# Patient Record
Sex: FEMALE | Race: WHITE | NOT HISPANIC OR LATINO | Employment: OTHER | URBAN - METROPOLITAN AREA
[De-identification: names, ages, dates, MRNs, and addresses within clinical notes are randomized per-mention and may not be internally consistent; named-entity substitution may affect disease eponyms.]

---

## 2017-08-09 ENCOUNTER — HISTORICAL (OUTPATIENT)
Dept: RADIOLOGY | Facility: HOSPITAL | Age: 62
End: 2017-08-09

## 2017-08-09 LAB
ABS NEUT (OLG): 3.76 X10(3)/MCL (ref 2.1–9.2)
ALBUMIN SERPL-MCNC: 3.9 GM/DL (ref 3.4–5)
ALBUMIN/GLOB SERPL: 1 {RATIO}
ALP SERPL-CCNC: 60 UNIT/L (ref 45–117)
ALT SERPL-CCNC: 24 UNIT/L (ref 13–56)
AST SERPL-CCNC: 19 UNIT/L (ref 15–37)
BASOPHILS # BLD AUTO: 0.08 X10(3)/MCL (ref 0–0.2)
BASOPHILS NFR BLD AUTO: 1.2 % (ref 0–1)
BILIRUB SERPL-MCNC: 0.5 MG/DL (ref 0.2–1)
BILIRUBIN DIRECT+TOT PNL SERPL-MCNC: 0.1 MG/DL (ref 0–0.2)
BILIRUBIN DIRECT+TOT PNL SERPL-MCNC: 0.4 MG/DL (ref 0–1)
BUN SERPL-MCNC: 18 MG/DL (ref 7–18)
CALCIUM SERPL-MCNC: 9.5 MG/DL (ref 8.5–10.1)
CANCER AG125 SERPL-ACNC: 14.3 IU/ML (ref 1.5–35)
CHLORIDE SERPL-SCNC: 103 MMOL/L (ref 98–107)
CHOLEST SERPL-MCNC: 215 MG/DL (ref 0–200)
CHOLEST/HDLC SERPL: 2.4 {RATIO} (ref 0–4)
CO2 SERPL-SCNC: 32 MMOL/L (ref 21–32)
CREAT SERPL-MCNC: 0.87 MG/DL (ref 0.55–1.02)
DEPRECATED CALCIDIOL+CALCIFEROL SERPL-MC: 41.04 NG/ML (ref 30–80)
EOSINOPHIL # BLD AUTO: 0.3 X10(3)/MCL (ref 0–0.9)
EOSINOPHIL NFR BLD AUTO: 4.5 % (ref 0–6.4)
ERYTHROCYTE [DISTWIDTH] IN BLOOD BY AUTOMATED COUNT: 14.2 % (ref 11.5–17)
EST. AVERAGE GLUCOSE BLD GHB EST-MCNC: 108 MG/DL
GLOBULIN SER-MCNC: 4 GM/DL (ref 2–4)
GLUCOSE SERPL-MCNC: 95 MG/DL (ref 74–106)
HBA1C MFR BLD: 5.4 % (ref 4.2–6.3)
HCT VFR BLD AUTO: 41.8 % (ref 37–47)
HDLC SERPL-MCNC: 90 MG/DL (ref 35–60)
HGB BLD-MCNC: 13.9 GM/DL (ref 12–16)
IMM GRANULOCYTES # BLD AUTO: 0.01 10*3/UL (ref 0–0.02)
IMM GRANULOCYTES NFR BLD AUTO: 0.2 % (ref 0–0.43)
LDLC SERPL CALC-MCNC: 114 MG/DL (ref 0–129)
LYMPHOCYTES # BLD AUTO: 2.08 X10(3)/MCL (ref 0.6–4.6)
LYMPHOCYTES NFR BLD AUTO: 31.4 % (ref 16–44)
MCH RBC QN AUTO: 29.3 PG (ref 27–31)
MCHC RBC AUTO-ENTMCNC: 33.3 GM/DL (ref 33–36)
MCV RBC AUTO: 88 FL (ref 80–94)
MONOCYTES # BLD AUTO: 0.39 X10(3)/MCL (ref 0.1–1.3)
MONOCYTES NFR BLD AUTO: 5.9 % (ref 4–12.1)
NEUTROPHILS # BLD AUTO: 3.76 X10(3)/MCL (ref 2.1–9.2)
NEUTROPHILS NFR BLD AUTO: 56.8 % (ref 43–73)
NRBC BLD AUTO-RTO: 0 % (ref 0–0.2)
PLATELET # BLD AUTO: 306 X10(3)/MCL (ref 130–400)
PMV BLD AUTO: 8.9 FL (ref 7.4–10.4)
POTASSIUM SERPL-SCNC: 4.4 MMOL/L (ref 3.5–5.1)
PROT SERPL-MCNC: 7.8 GM/DL (ref 6.4–8.2)
RBC # BLD AUTO: 4.75 X10(6)/MCL (ref 4.2–5.4)
SODIUM SERPL-SCNC: 140 MMOL/L (ref 136–145)
T4 FREE SERPL-MCNC: 1.14 NG/DL (ref 0.76–1.46)
TRIGL SERPL-MCNC: 57 MG/DL (ref 30–150)
TSH SERPL-ACNC: 1.08 MIU/ML (ref 0.36–3.74)
VLDLC SERPL CALC-MCNC: 11 MG/DL
WBC # SPEC AUTO: 6.6 X10(3)/MCL (ref 4.5–11.5)

## 2018-01-09 ENCOUNTER — HISTORICAL (OUTPATIENT)
Dept: RADIOLOGY | Facility: HOSPITAL | Age: 63
End: 2018-01-09

## 2019-11-12 ENCOUNTER — HISTORICAL (OUTPATIENT)
Dept: RADIATION THERAPY | Facility: HOSPITAL | Age: 64
End: 2019-11-12

## 2020-04-27 ENCOUNTER — HISTORICAL (OUTPATIENT)
Dept: HEMATOLOGY/ONCOLOGY | Facility: CLINIC | Age: 65
End: 2020-04-27

## 2020-04-27 LAB
ABS NEUT (OLG): 4.72 X10(3)/MCL (ref 2.1–9.2)
ALBUMIN SERPL-MCNC: 3.8 GM/DL (ref 3.4–4.8)
ALBUMIN/GLOB SERPL: 1.1 RATIO (ref 1.1–2)
ALP SERPL-CCNC: 49 UNIT/L (ref 40–150)
ALT SERPL-CCNC: 17 UNIT/L (ref 0–55)
AST SERPL-CCNC: 22 UNIT/L (ref 5–34)
BASOPHILS # BLD AUTO: 0.1 X10(3)/MCL (ref 0–0.2)
BASOPHILS NFR BLD AUTO: 0.8 %
BILIRUB SERPL-MCNC: 0.3 MG/DL
BILIRUBIN DIRECT+TOT PNL SERPL-MCNC: 0.1 MG/DL (ref 0–0.8)
BILIRUBIN DIRECT+TOT PNL SERPL-MCNC: 0.2 MG/DL (ref 0–0.5)
BUN SERPL-MCNC: 26 MG/DL (ref 9.8–20.1)
CALCIUM SERPL-MCNC: 9.4 MG/DL (ref 8.4–10.2)
CEA SERPL-MCNC: <1.73 NG/ML (ref 0–3)
CHLORIDE SERPL-SCNC: 102 MMOL/L (ref 98–107)
CO2 SERPL-SCNC: 33 MMOL/L (ref 23–31)
CREAT SERPL-MCNC: 1.17 MG/DL (ref 0.55–1.02)
EOSINOPHIL # BLD AUTO: 0.3 X10(3)/MCL (ref 0–0.9)
EOSINOPHIL NFR BLD AUTO: 4.2 %
ERYTHROCYTE [DISTWIDTH] IN BLOOD BY AUTOMATED COUNT: 14.6 % (ref 11.5–17)
GLOBULIN SER-MCNC: 3.4 GM/DL (ref 2.4–3.5)
GLUCOSE SERPL-MCNC: 113 MG/DL (ref 82–115)
HCT VFR BLD AUTO: 40.7 % (ref 37–47)
HGB BLD-MCNC: 12.8 GM/DL (ref 12–16)
LYMPHOCYTES # BLD AUTO: 2 X10(3)/MCL (ref 0.6–4.6)
LYMPHOCYTES NFR BLD AUTO: 25.7 %
MCH RBC QN AUTO: 29 PG (ref 27–31)
MCHC RBC AUTO-ENTMCNC: 31.4 GM/DL (ref 33–36)
MCV RBC AUTO: 92.3 FL (ref 80–94)
MONOCYTES # BLD AUTO: 0.6 X10(3)/MCL (ref 0.1–1.3)
MONOCYTES NFR BLD AUTO: 7.9 %
NEUTROPHILS # BLD AUTO: 4.7 X10(3)/MCL (ref 2.1–9.2)
NEUTROPHILS NFR BLD AUTO: 61.1 %
PLATELET # BLD AUTO: 313 X10(3)/MCL (ref 130–400)
PMV BLD AUTO: 9.1 FL (ref 9.4–12.4)
POTASSIUM SERPL-SCNC: 5.2 MMOL/L (ref 3.5–5.1)
PROT SERPL-MCNC: 7.2 GM/DL (ref 5.8–7.6)
RBC # BLD AUTO: 4.41 X10(6)/MCL (ref 4.2–5.4)
SODIUM SERPL-SCNC: 143 MMOL/L (ref 136–145)
WBC # SPEC AUTO: 7.7 X10(3)/MCL (ref 4.5–11.5)

## 2020-07-31 ENCOUNTER — HISTORICAL (OUTPATIENT)
Dept: HEMATOLOGY/ONCOLOGY | Facility: CLINIC | Age: 65
End: 2020-07-31

## 2020-07-31 LAB
ABS NEUT (OLG): 4.3 X10(3)/MCL (ref 2.1–9.2)
ALBUMIN SERPL-MCNC: 3.6 GM/DL (ref 3.4–5)
ALBUMIN/GLOB SERPL: 1.1 RATIO (ref 1.1–2)
ALP SERPL-CCNC: 44 UNIT/L (ref 40–150)
ALT SERPL-CCNC: 20 UNIT/L (ref 0–55)
AST SERPL-CCNC: 25 UNIT/L (ref 5–34)
BASOPHILS # BLD AUTO: 0 X10(3)/MCL (ref 0–0.2)
BASOPHILS NFR BLD AUTO: 0.7 %
BILIRUB SERPL-MCNC: 0.3 MG/DL
BILIRUBIN DIRECT+TOT PNL SERPL-MCNC: 0.1 MG/DL (ref 0–0.5)
BILIRUBIN DIRECT+TOT PNL SERPL-MCNC: 0.2 MG/DL (ref 0–0.8)
BUN SERPL-MCNC: 22.5 MG/DL (ref 9.8–20.1)
CALCIUM SERPL-MCNC: 9.1 MG/DL (ref 8.4–10.2)
CEA SERPL-MCNC: <1.73 MG/ML (ref 0–3)
CHLORIDE SERPL-SCNC: 102 MMOL/L (ref 98–107)
CO2 SERPL-SCNC: 32 MMOL/L (ref 23–31)
CREAT SERPL-MCNC: 0.79 MG/DL (ref 0.55–1.02)
EOSINOPHIL # BLD AUTO: 0.2 X10(3)/MCL (ref 0–0.9)
EOSINOPHIL NFR BLD AUTO: 2.8 %
ERYTHROCYTE [DISTWIDTH] IN BLOOD BY AUTOMATED COUNT: 14.1 % (ref 11.5–17)
GLOBULIN SER-MCNC: 3.2 GM/DL (ref 2.4–3.5)
GLUCOSE SERPL-MCNC: 76 MG/DL (ref 82–115)
HCT VFR BLD AUTO: 40.2 % (ref 37–47)
HGB BLD-MCNC: 12.7 GM/DL (ref 12–16)
LYMPHOCYTES # BLD AUTO: 1.6 X10(3)/MCL (ref 0.6–4.6)
LYMPHOCYTES NFR BLD AUTO: 24.3 %
MCH RBC QN AUTO: 29.6 PG (ref 27–31)
MCHC RBC AUTO-ENTMCNC: 31.6 GM/DL (ref 33–36)
MCV RBC AUTO: 93.7 FL (ref 80–94)
MONOCYTES # BLD AUTO: 0.6 X10(3)/MCL (ref 0.1–1.3)
MONOCYTES NFR BLD AUTO: 8.2 %
NEUTROPHILS # BLD AUTO: 4.3 X10(3)/MCL (ref 2.1–9.2)
NEUTROPHILS NFR BLD AUTO: 63.9 %
PLATELET # BLD AUTO: 280 X10(3)/MCL (ref 130–400)
PMV BLD AUTO: 8.7 FL (ref 9.4–12.4)
POTASSIUM SERPL-SCNC: 5.3 MMOL/L (ref 3.5–5.1)
PROT SERPL-MCNC: 6.8 GM/DL (ref 5.8–7.6)
RBC # BLD AUTO: 4.29 X10(6)/MCL (ref 4.2–5.4)
SODIUM SERPL-SCNC: 138 MMOL/L (ref 136–145)
WBC # SPEC AUTO: 6.7 X10(3)/MCL (ref 4.5–11.5)

## 2020-12-04 ENCOUNTER — HISTORICAL (OUTPATIENT)
Dept: HEMATOLOGY/ONCOLOGY | Facility: CLINIC | Age: 65
End: 2020-12-04

## 2020-12-04 LAB
ABS NEUT (OLG): 3.77 X10(3)/MCL (ref 2.1–9.2)
ALBUMIN SERPL-MCNC: 3.7 GM/DL (ref 3.4–4.8)
ALBUMIN/GLOB SERPL: 1.2 RATIO (ref 1.1–2)
ALP SERPL-CCNC: 44 UNIT/L (ref 40–150)
ALT SERPL-CCNC: 17 UNIT/L (ref 0–55)
AST SERPL-CCNC: 20 UNIT/L (ref 5–34)
BASOPHILS # BLD AUTO: 0 X10(3)/MCL (ref 0–0.2)
BASOPHILS NFR BLD AUTO: 0.8 %
BILIRUB SERPL-MCNC: 0.5 MG/DL
BILIRUBIN DIRECT+TOT PNL SERPL-MCNC: 0.2 MG/DL (ref 0–0.5)
BILIRUBIN DIRECT+TOT PNL SERPL-MCNC: 0.3 MG/DL (ref 0–0.8)
BUN SERPL-MCNC: 19.8 MG/DL (ref 9.8–20.1)
CALCIUM SERPL-MCNC: 9.1 MG/DL (ref 8.4–10.2)
CEA SERPL-MCNC: <1.73 NG/ML (ref 0–3)
CHLORIDE SERPL-SCNC: 103 MMOL/L (ref 98–107)
CO2 SERPL-SCNC: 30 MMOL/L (ref 23–31)
CREAT SERPL-MCNC: 0.82 MG/DL (ref 0.55–1.02)
EOSINOPHIL # BLD AUTO: 0.3 X10(3)/MCL (ref 0–0.9)
EOSINOPHIL NFR BLD AUTO: 4.2 %
ERYTHROCYTE [DISTWIDTH] IN BLOOD BY AUTOMATED COUNT: 14 % (ref 11.5–17)
GLOBULIN SER-MCNC: 3 GM/DL (ref 2.4–3.5)
GLUCOSE SERPL-MCNC: 94 MG/DL (ref 82–115)
HCT VFR BLD AUTO: 39.5 % (ref 37–47)
HGB BLD-MCNC: 12.7 GM/DL (ref 12–16)
LYMPHOCYTES # BLD AUTO: 1.7 X10(3)/MCL (ref 0.6–4.6)
LYMPHOCYTES NFR BLD AUTO: 27.6 %
MCH RBC QN AUTO: 30 PG (ref 27–31)
MCHC RBC AUTO-ENTMCNC: 32.2 GM/DL (ref 33–36)
MCV RBC AUTO: 93.2 FL (ref 80–94)
MONOCYTES # BLD AUTO: 0.4 X10(3)/MCL (ref 0.1–1.3)
MONOCYTES NFR BLD AUTO: 6 %
NEUTROPHILS # BLD AUTO: 3.8 X10(3)/MCL (ref 2.1–9.2)
NEUTROPHILS NFR BLD AUTO: 61.2 %
PLATELET # BLD AUTO: 275 X10(3)/MCL (ref 130–400)
PMV BLD AUTO: 9 FL (ref 9.4–12.4)
POTASSIUM SERPL-SCNC: 4.9 MMOL/L (ref 3.5–5.1)
PROT SERPL-MCNC: 6.7 GM/DL (ref 5.8–7.6)
RBC # BLD AUTO: 4.24 X10(6)/MCL (ref 4.2–5.4)
SODIUM SERPL-SCNC: 139 MMOL/L (ref 136–145)
WBC # SPEC AUTO: 6.2 X10(3)/MCL (ref 4.5–11.5)

## 2021-04-07 ENCOUNTER — HISTORICAL (OUTPATIENT)
Dept: HEMATOLOGY/ONCOLOGY | Facility: CLINIC | Age: 66
End: 2021-04-07

## 2021-04-07 LAB
ABS NEUT (OLG): 5.03 X10(3)/MCL (ref 2.1–9.2)
ALBUMIN SERPL-MCNC: 3.9 GM/DL (ref 3.4–4.8)
ALBUMIN/GLOB SERPL: 1.3 RATIO (ref 1.1–2)
ALP SERPL-CCNC: 45 UNIT/L (ref 40–150)
ALT SERPL-CCNC: 14 UNIT/L (ref 0–55)
AST SERPL-CCNC: 22 UNIT/L (ref 5–34)
BASOPHILS # BLD AUTO: 0.1 X10(3)/MCL (ref 0–0.2)
BASOPHILS NFR BLD AUTO: 0.8 %
BILIRUB SERPL-MCNC: 0.4 MG/DL
BILIRUBIN DIRECT+TOT PNL SERPL-MCNC: 0.2 MG/DL (ref 0–0.5)
BILIRUBIN DIRECT+TOT PNL SERPL-MCNC: 0.2 MG/DL (ref 0–0.8)
BUN SERPL-MCNC: 14.2 MG/DL (ref 9.8–20.1)
CALCIUM SERPL-MCNC: 9.5 MG/DL (ref 8.4–10.2)
CEA SERPL-MCNC: <1.73 NG/ML (ref 0–3)
CHLORIDE SERPL-SCNC: 101 MMOL/L (ref 98–107)
CO2 SERPL-SCNC: 31 MMOL/L (ref 23–31)
CREAT SERPL-MCNC: 0.8 MG/DL (ref 0.55–1.02)
EOSINOPHIL # BLD AUTO: 0.3 X10(3)/MCL (ref 0–0.9)
EOSINOPHIL NFR BLD AUTO: 4 %
ERYTHROCYTE [DISTWIDTH] IN BLOOD BY AUTOMATED COUNT: 13.6 % (ref 11.5–17)
GLOBULIN SER-MCNC: 2.9 GM/DL (ref 2.4–3.5)
GLUCOSE SERPL-MCNC: 94 MG/DL (ref 82–115)
HCT VFR BLD AUTO: 39.9 % (ref 37–47)
HGB BLD-MCNC: 12.7 GM/DL (ref 12–16)
LYMPHOCYTES # BLD AUTO: 1.6 X10(3)/MCL (ref 0.6–4.6)
LYMPHOCYTES NFR BLD AUTO: 21.2 %
MCH RBC QN AUTO: 29.7 PG (ref 27–31)
MCHC RBC AUTO-ENTMCNC: 31.8 GM/DL (ref 33–36)
MCV RBC AUTO: 93.4 FL (ref 80–94)
MONOCYTES # BLD AUTO: 0.4 X10(3)/MCL (ref 0.1–1.3)
MONOCYTES NFR BLD AUTO: 6 %
NEUTROPHILS # BLD AUTO: 5 X10(3)/MCL (ref 2.1–9.2)
NEUTROPHILS NFR BLD AUTO: 67.7 %
PLATELET # BLD AUTO: 263 X10(3)/MCL (ref 130–400)
PMV BLD AUTO: 8.8 FL (ref 9.4–12.4)
POTASSIUM SERPL-SCNC: 4.5 MMOL/L (ref 3.5–5.1)
PROT SERPL-MCNC: 6.8 GM/DL (ref 5.8–7.6)
RBC # BLD AUTO: 4.27 X10(6)/MCL (ref 4.2–5.4)
SODIUM SERPL-SCNC: 141 MMOL/L (ref 136–145)
WBC # SPEC AUTO: 7.4 X10(3)/MCL (ref 4.5–11.5)

## 2021-08-11 ENCOUNTER — HISTORICAL (OUTPATIENT)
Dept: HEMATOLOGY/ONCOLOGY | Facility: CLINIC | Age: 66
End: 2021-08-11

## 2021-08-11 LAB
ABS NEUT (OLG): 1 X10(3)/MCL (ref 2.1–9.2)
ALBUMIN SERPL-MCNC: 3.9 GM/DL (ref 3.4–4.8)
ALBUMIN/GLOB SERPL: 1.1 RATIO (ref 1.1–2)
ALP SERPL-CCNC: 58 UNIT/L (ref 40–150)
ALT SERPL-CCNC: 38 UNIT/L (ref 0–55)
ANISOCYTOSIS BLD QL SMEAR: 0
AST SERPL-CCNC: 36 UNIT/L (ref 5–34)
BASOPHILS # BLD AUTO: 0 X10(3)/MCL (ref 0–0.2)
BASOPHILS NFR BLD AUTO: 0.4 %
BASOPHILS NFR BLD MANUAL: 2 % (ref 0–2)
BILIRUB SERPL-MCNC: 0.4 MG/DL
BILIRUBIN DIRECT+TOT PNL SERPL-MCNC: 0.1 MG/DL (ref 0–0.5)
BILIRUBIN DIRECT+TOT PNL SERPL-MCNC: 0.3 MG/DL (ref 0–0.8)
BUN SERPL-MCNC: 14.4 MG/DL (ref 9.8–20.1)
CALCIUM SERPL-MCNC: 9.9 MG/DL (ref 8.4–10.2)
CEA SERPL-MCNC: <1.73 NG/ML (ref 0–3)
CHLORIDE SERPL-SCNC: 98 MMOL/L (ref 98–107)
CO2 SERPL-SCNC: 32 MMOL/L (ref 23–31)
CREAT SERPL-MCNC: 0.85 MG/DL (ref 0.55–1.02)
EOSINOPHIL # BLD AUTO: 0 X10(3)/MCL (ref 0–0.9)
EOSINOPHIL NFR BLD AUTO: 0.2 %
EOSINOPHIL NFR BLD MANUAL: 1 % (ref 0–8)
ERYTHROCYTE [DISTWIDTH] IN BLOOD BY AUTOMATED COUNT: 13.9 % (ref 11.5–17)
GLOBULIN SER-MCNC: 3.5 GM/DL (ref 2.4–3.5)
GLUCOSE SERPL-MCNC: 94 MG/DL (ref 82–115)
HCT VFR BLD AUTO: 38.5 % (ref 37–47)
HGB BLD-MCNC: 12.3 GM/DL (ref 12–16)
HYPOCHROMIA BLD QL SMEAR: 0
LYMPHOCYTES # BLD AUTO: 2.9 X10(3)/MCL (ref 0.6–4.6)
LYMPHOCYTES NFR BLD AUTO: 64.5 %
LYMPHOCYTES NFR BLD MANUAL: 43 % (ref 13–40)
MACROCYTES BLD QL SMEAR: 0
MCH RBC QN AUTO: 28.7 PG (ref 27–31)
MCHC RBC AUTO-ENTMCNC: 31.9 GM/DL (ref 33–36)
MCV RBC AUTO: 89.7 FL (ref 80–94)
MICROCYTES BLD QL SMEAR: 0
MONOCYTES # BLD AUTO: 0.6 X10(3)/MCL (ref 0.1–1.3)
MONOCYTES NFR BLD AUTO: 12.4 %
MONOCYTES NFR BLD MANUAL: 20 % (ref 2–11)
NEUTROPHILS # BLD AUTO: 1 X10(3)/MCL (ref 2.1–9.2)
NEUTROPHILS NFR BLD AUTO: 22.5 %
NEUTROPHILS NFR BLD MANUAL: 34 % (ref 47–80)
PLATELET # BLD AUTO: 263 X10(3)/MCL (ref 130–400)
PLATELET # BLD EST: NORMAL 10*3/UL
PMV BLD AUTO: 9 FL (ref 9.4–12.4)
POIKILOCYTOSIS BLD QL SMEAR: 0
POLYCHROMASIA BLD QL SMEAR: 0
POTASSIUM SERPL-SCNC: 5.4 MMOL/L (ref 3.5–5.1)
PROT SERPL-MCNC: 7.4 GM/DL (ref 5.8–7.6)
RBC # BLD AUTO: 4.29 X10(6)/MCL (ref 4.2–5.4)
SCHISTOCYTES BLD QL AUTO: 0
SODIUM SERPL-SCNC: 139 MMOL/L (ref 136–145)
SPHEROCYTES BLD QL SMEAR: 0
TARGETS BLD QL SMEAR: 0
WBC # SPEC AUTO: 4.4 X10(3)/MCL (ref 4.5–11.5)

## 2021-12-07 ENCOUNTER — HISTORICAL (OUTPATIENT)
Dept: HEMATOLOGY/ONCOLOGY | Facility: CLINIC | Age: 66
End: 2021-12-07

## 2021-12-07 LAB
ABS NEUT (OLG): 2.57 X10(3)/MCL (ref 2.1–9.2)
ALBUMIN SERPL-MCNC: 4 GM/DL (ref 3.4–4.8)
ALBUMIN/GLOB SERPL: 1.1 RATIO (ref 1.1–2)
ALP SERPL-CCNC: 48 UNIT/L (ref 40–150)
ALT SERPL-CCNC: 19 UNIT/L (ref 0–55)
AST SERPL-CCNC: 23 UNIT/L (ref 5–34)
BASOPHILS # BLD AUTO: 0 X10(3)/MCL (ref 0–0.2)
BASOPHILS NFR BLD AUTO: 0.4 %
BILIRUB SERPL-MCNC: 0.4 MG/DL
BILIRUBIN DIRECT+TOT PNL SERPL-MCNC: 0.1 MG/DL (ref 0–0.5)
BILIRUBIN DIRECT+TOT PNL SERPL-MCNC: 0.3 MG/DL (ref 0–0.8)
BUN SERPL-MCNC: 21.5 MG/DL (ref 9.8–20.1)
CALCIUM SERPL-MCNC: 10.2 MG/DL (ref 8.7–10.5)
CEA SERPL-MCNC: <1.73 NG/ML (ref 0–3)
CHLORIDE SERPL-SCNC: 102 MMOL/L (ref 98–107)
CO2 SERPL-SCNC: 30 MMOL/L (ref 23–31)
CREAT SERPL-MCNC: 0.84 MG/DL (ref 0.55–1.02)
EOSINOPHIL # BLD AUTO: 0.2 X10(3)/MCL (ref 0–0.9)
EOSINOPHIL NFR BLD AUTO: 4 %
ERYTHROCYTE [DISTWIDTH] IN BLOOD BY AUTOMATED COUNT: 15.7 % (ref 11.5–17)
GLOBULIN SER-MCNC: 3.7 GM/DL (ref 2.4–3.5)
GLUCOSE SERPL-MCNC: 76 MG/DL (ref 82–115)
HCT VFR BLD AUTO: 41.8 % (ref 37–47)
HGB BLD-MCNC: 13.6 GM/DL (ref 12–16)
LYMPHOCYTES # BLD AUTO: 2.4 X10(3)/MCL (ref 0.6–4.6)
LYMPHOCYTES NFR BLD AUTO: 42.2 %
MCH RBC QN AUTO: 28.3 PG (ref 27–31)
MCHC RBC AUTO-ENTMCNC: 32.5 GM/DL (ref 33–36)
MCV RBC AUTO: 86.9 FL (ref 80–94)
MONOCYTES # BLD AUTO: 0.5 X10(3)/MCL (ref 0.1–1.3)
MONOCYTES NFR BLD AUTO: 8.1 %
NEUTROPHILS # BLD AUTO: 2.6 X10(3)/MCL (ref 2.1–9.2)
NEUTROPHILS NFR BLD AUTO: 45.1 %
PLATELET # BLD AUTO: 260 X10(3)/MCL (ref 130–400)
PMV BLD AUTO: 9 FL (ref 9.4–12.4)
POTASSIUM SERPL-SCNC: 5 MMOL/L (ref 3.5–5.1)
PROT SERPL-MCNC: 7.7 GM/DL (ref 5.8–7.6)
RBC # BLD AUTO: 4.81 X10(6)/MCL (ref 4.2–5.4)
SODIUM SERPL-SCNC: 140 MMOL/L (ref 136–145)
WBC # SPEC AUTO: 5.7 X10(3)/MCL (ref 4.5–11.5)

## 2022-04-11 ENCOUNTER — HISTORICAL (OUTPATIENT)
Dept: ADMINISTRATIVE | Facility: HOSPITAL | Age: 67
End: 2022-04-11
Payer: COMMERCIAL

## 2022-04-28 VITALS
DIASTOLIC BLOOD PRESSURE: 58 MMHG | SYSTOLIC BLOOD PRESSURE: 107 MMHG | WEIGHT: 110.25 LBS | BODY MASS INDEX: 20.29 KG/M2 | OXYGEN SATURATION: 97 % | HEIGHT: 62 IN

## 2022-06-02 DIAGNOSIS — C50.512 MALIGNANT NEOPLASM OF LOWER-OUTER QUADRANT OF LEFT FEMALE BREAST, UNSPECIFIED ESTROGEN RECEPTOR STATUS: Primary | ICD-10-CM

## 2022-06-07 ENCOUNTER — LAB VISIT (OUTPATIENT)
Dept: LAB | Facility: HOSPITAL | Age: 67
End: 2022-06-07
Attending: INTERNAL MEDICINE
Payer: MEDICARE

## 2022-06-07 DIAGNOSIS — C50.512 MALIGNANT NEOPLASM OF LOWER-OUTER QUADRANT OF LEFT FEMALE BREAST, UNSPECIFIED ESTROGEN RECEPTOR STATUS: ICD-10-CM

## 2022-06-07 LAB
ALBUMIN SERPL-MCNC: 3.9 GM/DL (ref 3.4–4.8)
ALBUMIN/GLOB SERPL: 1.1 RATIO (ref 1.1–2)
ALP SERPL-CCNC: 47 UNIT/L (ref 40–150)
ALT SERPL-CCNC: 19 UNIT/L (ref 0–55)
AST SERPL-CCNC: 21 UNIT/L (ref 5–34)
BASOPHILS # BLD AUTO: 0.04 X10(3)/MCL (ref 0–0.2)
BASOPHILS NFR BLD AUTO: 0.6 %
BILIRUBIN DIRECT+TOT PNL SERPL-MCNC: 0.4 MG/DL
BUN SERPL-MCNC: 17.6 MG/DL (ref 9.8–20.1)
CALCIUM SERPL-MCNC: 10 MG/DL (ref 8.4–10.2)
CEA SERPL-MCNC: <1.73 NG/ML (ref 0–3)
CHLORIDE SERPL-SCNC: 100 MMOL/L (ref 98–107)
CO2 SERPL-SCNC: 31 MMOL/L (ref 23–31)
CREAT SERPL-MCNC: 0.88 MG/DL (ref 0.55–1.02)
EOSINOPHIL # BLD AUTO: 0.13 X10(3)/MCL (ref 0–0.9)
EOSINOPHIL NFR BLD AUTO: 2.1 %
ERYTHROCYTE [DISTWIDTH] IN BLOOD BY AUTOMATED COUNT: 14.5 % (ref 11.5–17)
GLOBULIN SER-MCNC: 3.6 GM/DL (ref 2.4–3.5)
GLUCOSE SERPL-MCNC: 109 MG/DL (ref 82–115)
HCT VFR BLD AUTO: 40.5 % (ref 37–47)
HGB BLD-MCNC: 13.1 GM/DL (ref 12–16)
IMM GRANULOCYTES # BLD AUTO: 0.01 X10(3)/MCL (ref 0–0.02)
IMM GRANULOCYTES NFR BLD AUTO: 0.2 % (ref 0–0.43)
LYMPHOCYTES # BLD AUTO: 2.88 X10(3)/MCL (ref 0.6–4.6)
LYMPHOCYTES NFR BLD AUTO: 46 %
MCH RBC QN AUTO: 28.7 PG (ref 27–31)
MCHC RBC AUTO-ENTMCNC: 32.3 MG/DL (ref 33–36)
MCV RBC AUTO: 88.6 FL (ref 80–94)
MONOCYTES # BLD AUTO: 0.49 X10(3)/MCL (ref 0.1–1.3)
MONOCYTES NFR BLD AUTO: 7.8 %
NEUTROPHILS # BLD AUTO: 2.7 X10(3)/MCL (ref 2.1–9.2)
NEUTROPHILS NFR BLD AUTO: 43.3 %
PLATELET # BLD AUTO: 250 X10(3)/MCL (ref 130–400)
PMV BLD AUTO: 8.9 FL (ref 9.4–12.4)
POTASSIUM SERPL-SCNC: 4.9 MMOL/L (ref 3.5–5.1)
PROT SERPL-MCNC: 7.5 GM/DL (ref 5.8–7.6)
RBC # BLD AUTO: 4.57 X10(6)/MCL (ref 4.2–5.4)
SODIUM SERPL-SCNC: 140 MMOL/L (ref 136–145)
WBC # SPEC AUTO: 6.3 X10(3)/MCL (ref 4.5–11.5)

## 2022-06-07 PROCEDURE — 80053 COMPREHEN METABOLIC PANEL: CPT

## 2022-06-07 PROCEDURE — 82378 CARCINOEMBRYONIC ANTIGEN: CPT

## 2022-06-07 PROCEDURE — 36415 COLL VENOUS BLD VENIPUNCTURE: CPT

## 2022-06-07 PROCEDURE — 85025 COMPLETE CBC W/AUTO DIFF WBC: CPT

## 2022-06-09 LAB — CANCER AG27-29 SERPL-ACNC: 22.1 U/ML

## 2022-06-10 NOTE — PROGRESS NOTES
Subjective:       Patient ID: Tania Velasco is a 66 y.o. female.    Chief Complaint:  I'm doing okay    HPI  Diagnosis: Stage IA left breast cancer 10/19 (T1a N0 M0) 0.4 cm, GII, ER/AR negative, HER-2 negative                    Extensive DCIS left breast                    S/p bilateral mastectomies                    History of stage IA right breast cancer 10/08 (T1b N0 M0), 0.8 cm, GIII, ER/AR +, HER-2 negative                    Postmenopausal s/p hysterectomy           + COVID-19 vaccination    Current Treatment: Observation; Raloxifene for bone density    Clinical History:  Patient has a history of stage IA right breast cancer 10/08 s/p lumpectomy and sentinel lymph node dissection. Pathology showed a grade 3 infiltrating ductal carcinoma measuring 0.8 cm and associated DCIS. 2 sentinel lymph nodes were negative for involvement. Tumor was ER/AR positive, HER-2 negative. She apparently had a low risk Oncotype score. She received adjuvant radiation therapy and Tamoxifen for 5 years. She was under the care of Dr. Sage Oneill.    She had an abnormal annual mammogram 9/16/19 at [facility] showing suspicious calcifications in the left lower breast at 6:00. Core biopsy 9/20/19 was positive for high-grade DCIS, focally suspicious for microinvasion. Estrogen and progesterone receptors were negative. Bilateral breast MRI 10/7/19 showed an area of non-mass enhancement in the lower outer quadrant of the left breast at the site of the biopsy clip measuring 2.1 x 0.8 cm. There is no suspicious axillary or internal mammary adenopathy. Right breast showed no suspicious lesions or enhancement. She was seen as a new patient 10/29/19. She was very nervous and worried about her new diagnosis of DCIS. She had multiple questions regarding prognosis and adjuvant treatment options, the majority of which could not be answered until final pathology was obtained. She lost her  to a primary mediastinal germ cell tumor. She  underwent a left breast lumpectomy and sentinel lymph node dissection 10/30/19.    Final pathology showed a 0.4 cm grade 2, triple negative infiltrating ductal carcinoma and extensive DCIS. Initial resection margins showed invasive carcinoma less than 1 mm from the medial and anterior margins. Additional resection showed negative margins for invasive carcinoma but focally positive for DCIS. 5 sentinel lymph nodes were negative for involvement. She saw Dr. Becerril for a second surgical opinion. She elected to proceed with bilateral mastectomies 12/6/19. Left breast showed a residual focus of DCIS measuring 1.0 cm adjacent to the previous biopsy site. There was no evidence of residual invasive carcinoma. Right breast showed benign pathologic changes. Given her small tumor size and mastectomy, there were no indications for adjuvant chemotherapy or radiation. Genetic testing showed no significant mutations.    She had an area of darkened skin adjacent to the left mastectomy incision.  She was seen by Surgery and Dermatology.  Findings were not suspicious for disease recurrence.  Punch biopsies were performed which revealed scar tissue with no evidence of malignancy.  She was placed on Raloxifene for treatment of her bone density by Gynecology.      Interval History  She returns to the office today by herself for a 6 month surveillance appointment.  She feels well.  She reports no recent illnesses or changes in her medications.  She remains on raloxifene for her osteoporosis.  Is not having any significant side effects associated with her treatment.  She is scheduled for a follow-up bone density exam in August.  He reports no changes on her self examination following bilateral mastectomies.  Laboratory testing for this visit showed no significant abnormalities.    Review of Systems   Constitutional: Negative for appetite change, fatigue and fever.   HENT: Negative for mouth sores, sore throat and trouble swallowing.   "  Eyes: Negative.    Respiratory: Negative for cough, chest tightness and shortness of breath.    Cardiovascular: Negative for chest pain, palpitations and leg swelling.   Gastrointestinal: Negative for abdominal distention, abdominal pain, blood in stool, change in bowel habit, constipation, diarrhea, nausea, vomiting and change in bowel habit.   Genitourinary: Negative for dysuria, frequency and urgency.   Musculoskeletal: Negative for arthralgias and back pain.   Integumentary:  Negative for rash and mole/lesion.   Hematological: Negative for adenopathy. Does not bruise/bleed easily.     PMHx:  Right breast cancer, glaucoma, GERD, cataracts, osteoporosis  Menarche age 11, first pregnancy 27, 3 children (+ breast fed), hysterectomy 39, menopause 46, no HRT  PSHx:  Left breast fibroadenoma resection, right lumpectomy, bilateral breast biopsies, hysterectomy, thyroid nodule resection,  x3, glaucoma surgery, left lumpectomy, bilateral mastectomies  SH:  Lifetime nonsmoker, occasional alcohol use. Lives in [place] with her . 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 Her paternal grandmother had ovarian cancer in a paternal aunt had liver cancer       Objective:        BP (!) 102/54 (BP Location: Right arm, Patient Position: Sitting, BP Method: Small (Manual))   Pulse 60   Temp 98.1 °F (36.7 °C)   Resp 18   Ht 5' 2" (1.575 m)   Wt 49.2 kg (108 lb 7.5 oz)   SpO2 98%   BMI 19.84 kg/m²    Physical Exam  Constitutional:       Appearance: Normal appearance.      Comments: Thin white female in NAD.   HENT:      Head: Normocephalic.      Nose: Nose normal.      Mouth/Throat:      Mouth: Mucous membranes are moist.      Pharynx: Oropharynx is clear. No posterior oropharyngeal erythema.   Eyes:      Extraocular Movements: Extraocular movements intact.      Conjunctiva/sclera: Conjunctivae normal.      Pupils: Pupils are equal, round, and reactive to light. "   Cardiovascular:      Rate and Rhythm: Normal rate and regular rhythm.      Heart sounds: No murmur heard.  Pulmonary:      Breath sounds: Normal breath sounds.   Chest:      Comments: Well healed bilateral mastectomy incisions. 2 cm area of thickened skin adjacent to left incision (unchanged). No suspicious masses or axillary nodes bilaterally.  Abdominal:      General: Abdomen is flat. Bowel sounds are normal. There is no distension.      Palpations: Abdomen is soft. There is no mass.      Tenderness: There is no abdominal tenderness.   Musculoskeletal:         General: No swelling or tenderness. Normal range of motion.      Cervical back: Neck supple. No tenderness.   Lymphadenopathy:      Cervical: No cervical adenopathy.   Skin:     General: Skin is warm and dry.      Findings: No rash.   Neurological:      General: No focal deficit present.      Mental Status: She is alert and oriented to person, place, and time.      Cranial Nerves: No cranial nerve deficit.      Gait: Gait normal.       ECOG SCORE    0 - Fully active-able to carry on all pre-disease performance without restriction          LABORATORY  No results found for this or any previous visit (from the past 168 hour(s)).       Labs from 6/07/22 reviewed    Assessment:   Stage IA triple negative left breast cancer 10/19 - [facility]  Extensive DCIS left breast  S/p bilateral mastectomies  History of stage IA right breast cancer 10/0 8  Postmenopausal s/p hysterectomy      Plan:   Patient continues to do well with no clinical or laboratory findings suspicious for disease recurrence.  Follow-up bone density exam scheduled in August with gynecology.  RTC in 6 months for a follow-up visit and clinical exam laboratory.      NYDIA HUGO MD    Other Physicians  Dr. Kahlil De Oliveira

## 2022-06-13 RX ORDER — LATANOPROST 0.05 MG/ML
SOLUTION/ DROPS OPHTHALMIC; TOPICAL
COMMUNITY
Start: 2022-05-26

## 2022-06-13 RX ORDER — TIMOLOL MALEATE 5 MG/ML
1 SOLUTION/ DROPS OPHTHALMIC 2 TIMES DAILY
COMMUNITY
Start: 2022-05-25

## 2022-06-13 RX ORDER — RALOXIFENE HYDROCHLORIDE 60 MG/1
60 TABLET, FILM COATED ORAL DAILY
COMMUNITY
Start: 2022-03-13

## 2022-06-13 RX ORDER — BETA-GLUCAN, (1-3) (1-4) 70 %
POWDER (GRAM) MISCELLANEOUS
COMMUNITY

## 2022-06-13 RX ORDER — HYDROCORTISONE 25 MG/G
CREAM TOPICAL
COMMUNITY
Start: 2022-05-10 | End: 2023-06-19

## 2022-06-14 ENCOUNTER — OFFICE VISIT (OUTPATIENT)
Dept: HEMATOLOGY/ONCOLOGY | Facility: CLINIC | Age: 67
End: 2022-06-14
Payer: MEDICARE

## 2022-06-14 VITALS
TEMPERATURE: 98 F | SYSTOLIC BLOOD PRESSURE: 102 MMHG | WEIGHT: 108.44 LBS | HEIGHT: 62 IN | OXYGEN SATURATION: 98 % | HEART RATE: 60 BPM | RESPIRATION RATE: 18 BRPM | DIASTOLIC BLOOD PRESSURE: 54 MMHG | BODY MASS INDEX: 19.96 KG/M2

## 2022-06-14 DIAGNOSIS — Z17.1 MALIGNANT NEOPLASM OF LOWER-OUTER QUADRANT OF LEFT BREAST OF FEMALE, ESTROGEN RECEPTOR NEGATIVE: Primary | ICD-10-CM

## 2022-06-14 DIAGNOSIS — C50.512 MALIGNANT NEOPLASM OF LOWER-OUTER QUADRANT OF LEFT BREAST OF FEMALE, ESTROGEN RECEPTOR NEGATIVE: Primary | ICD-10-CM

## 2022-06-14 PROCEDURE — 99214 PR OFFICE/OUTPT VISIT, EST, LEVL IV, 30-39 MIN: ICD-10-PCS | Mod: S$PBB,,, | Performed by: INTERNAL MEDICINE

## 2022-06-14 PROCEDURE — 99214 OFFICE O/P EST MOD 30 MIN: CPT | Mod: S$PBB,,, | Performed by: INTERNAL MEDICINE

## 2022-06-14 PROCEDURE — 99999 PR PBB SHADOW E&M-EST. PATIENT-LVL III: ICD-10-PCS | Mod: PBBFAC,,, | Performed by: INTERNAL MEDICINE

## 2022-06-14 PROCEDURE — 99213 OFFICE O/P EST LOW 20 MIN: CPT | Mod: PBBFAC | Performed by: INTERNAL MEDICINE

## 2022-06-14 PROCEDURE — 99999 PR PBB SHADOW E&M-EST. PATIENT-LVL III: CPT | Mod: PBBFAC,,, | Performed by: INTERNAL MEDICINE

## 2022-06-18 ENCOUNTER — OFFICE VISIT (OUTPATIENT)
Dept: URGENT CARE | Facility: CLINIC | Age: 67
End: 2022-06-18
Payer: MEDICARE

## 2022-06-18 VITALS
SYSTOLIC BLOOD PRESSURE: 120 MMHG | HEART RATE: 57 BPM | BODY MASS INDEX: 20.06 KG/M2 | WEIGHT: 109 LBS | HEIGHT: 62 IN | DIASTOLIC BLOOD PRESSURE: 77 MMHG | OXYGEN SATURATION: 97 % | RESPIRATION RATE: 20 BRPM | TEMPERATURE: 100 F

## 2022-06-18 DIAGNOSIS — J02.9 SORE THROAT: Primary | ICD-10-CM

## 2022-06-18 DIAGNOSIS — J06.9 UPPER RESPIRATORY TRACT INFECTION, UNSPECIFIED TYPE: ICD-10-CM

## 2022-06-18 LAB
CTP QC/QA: YES
CTP QC/QA: YES
S PYO RRNA THROAT QL PROBE: NEGATIVE
SARS-COV-2 RDRP RESP QL NAA+PROBE: NEGATIVE

## 2022-06-18 PROCEDURE — 96372 THER/PROPH/DIAG INJ SC/IM: CPT | Mod: ,,, | Performed by: NURSE PRACTITIONER

## 2022-06-18 PROCEDURE — 87880 POCT RAPID STREP A: ICD-10-PCS | Mod: QW,,, | Performed by: NURSE PRACTITIONER

## 2022-06-18 PROCEDURE — 96372 PR INJECTION,THERAP/PROPH/DIAG2ST, IM OR SUBCUT: ICD-10-PCS | Mod: ,,, | Performed by: NURSE PRACTITIONER

## 2022-06-18 PROCEDURE — U0002 COVID-19 LAB TEST NON-CDC: HCPCS | Mod: QW,CR,, | Performed by: NURSE PRACTITIONER

## 2022-06-18 PROCEDURE — U0002: ICD-10-PCS | Mod: QW,CR,, | Performed by: NURSE PRACTITIONER

## 2022-06-18 PROCEDURE — 87880 STREP A ASSAY W/OPTIC: CPT | Mod: QW,,, | Performed by: NURSE PRACTITIONER

## 2022-06-18 PROCEDURE — 99213 PR OFFICE/OUTPT VISIT, EST, LEVL III, 20-29 MIN: ICD-10-PCS | Mod: 25,,, | Performed by: NURSE PRACTITIONER

## 2022-06-18 PROCEDURE — 99213 OFFICE O/P EST LOW 20 MIN: CPT | Mod: 25,,, | Performed by: NURSE PRACTITIONER

## 2022-06-18 RX ORDER — BETAMETHASONE SODIUM PHOSPHATE AND BETAMETHASONE ACETATE 3; 3 MG/ML; MG/ML
6 INJECTION, SUSPENSION INTRA-ARTICULAR; INTRALESIONAL; INTRAMUSCULAR; SOFT TISSUE
Status: COMPLETED | OUTPATIENT
Start: 2022-06-18 | End: 2022-06-18

## 2022-06-18 RX ADMIN — BETAMETHASONE SODIUM PHOSPHATE AND BETAMETHASONE ACETATE 6 MG: 3; 3 INJECTION, SUSPENSION INTRA-ARTICULAR; INTRALESIONAL; INTRAMUSCULAR; SOFT TISSUE at 09:06

## 2022-06-18 NOTE — PATIENT INSTRUCTIONS
Mucinex OTC as directed  Increase oral fluids  Ibuprofen or Tylenol as directed for pain or fever  Please follow up with your primary care provider within 2-5 days if your signs and symptoms have not resolved or worsen.

## 2022-06-18 NOTE — PROGRESS NOTES
"Subjective:       Patient ID: Tania Velasco is a 66 y.o. female.    Vitals:  height is 5' 2" (1.575 m) and weight is 49.4 kg (109 lb). Her tympanic temperature is 99.6 °F (37.6 °C). Her blood pressure is 120/77 and her pulse is 57 (abnormal). Her respiration is 20 and oxygen saturation is 97%.     Chief Complaint: Sore Throat    66-year-old female presents with sinus congestion, postnasal drip, sore throat, and mild cough.  Onset 2 days ago.  No shortness of breath or fever.    Sore Throat   Associated symptoms include coughing.       HENT: Positive for postnasal drip and sore throat.    Respiratory: Positive for cough.        Objective:      Physical Exam   Constitutional: She is oriented to person, place, and time. She appears well-developed. She is cooperative.  Non-toxic appearance. She does not appear ill. No distress.   HENT:   Head: Normocephalic and atraumatic.   Ears:   Right Ear: Hearing, tympanic membrane, external ear and ear canal normal.   Left Ear: Hearing, tympanic membrane, external ear and ear canal normal.   Nose: Nose normal. No mucosal edema, rhinorrhea or nasal deformity. No epistaxis. Right sinus exhibits no maxillary sinus tenderness and no frontal sinus tenderness. Left sinus exhibits no maxillary sinus tenderness and no frontal sinus tenderness.   Mouth/Throat: Uvula is midline, oropharynx is clear and moist and mucous membranes are normal. No trismus in the jaw. Normal dentition. No uvula swelling. No oropharyngeal exudate, posterior oropharyngeal edema or posterior oropharyngeal erythema.   Eyes: Conjunctivae and lids are normal. No scleral icterus.   Neck: Trachea normal and phonation normal. Neck supple. No edema present. No erythema present. No neck rigidity present.   Cardiovascular: Normal rate, regular rhythm, normal heart sounds and normal pulses.   Pulmonary/Chest: Effort normal and breath sounds normal. No respiratory distress. She has no decreased breath sounds. She has no " rhonchi.   Abdominal: Normal appearance.   Musculoskeletal: Normal range of motion.         General: No deformity. Normal range of motion.   Neurological: She is alert and oriented to person, place, and time. She exhibits normal muscle tone. Coordination normal.   Skin: Skin is warm, dry, intact, not diaphoretic and not pale.   Psychiatric: Her speech is normal and behavior is normal. Judgment and thought content normal.   Nursing note and vitals reviewed.        Assessment:       1. Sore throat    2. Upper respiratory tract infection, unspecified type          Plan:     Mucinex OTC as directed  Increase oral fluids  Ibuprofen or Tylenol as directed for pain or fever  Please follow up with your primary care provider within 2-5 days if your signs and symptoms have not resolved or worsen.     Sore throat  -     POCT COVID-19 Rapid Screening  -     POCT rapid strep A    Upper respiratory tract infection, unspecified type  -     betamethasone acetate-betamethasone sodium phosphate injection 6 mg

## 2022-07-02 ENCOUNTER — OFFICE VISIT (OUTPATIENT)
Dept: URGENT CARE | Facility: CLINIC | Age: 67
End: 2022-07-02
Payer: MEDICARE

## 2022-07-02 VITALS
TEMPERATURE: 101 F | DIASTOLIC BLOOD PRESSURE: 63 MMHG | WEIGHT: 110 LBS | HEIGHT: 62 IN | RESPIRATION RATE: 20 BRPM | HEART RATE: 74 BPM | SYSTOLIC BLOOD PRESSURE: 140 MMHG | BODY MASS INDEX: 20.24 KG/M2 | OXYGEN SATURATION: 100 %

## 2022-07-02 DIAGNOSIS — J40 BRONCHITIS: ICD-10-CM

## 2022-07-02 DIAGNOSIS — R05.9 COUGH: Primary | ICD-10-CM

## 2022-07-02 LAB
CTP QC/QA: YES
SARS-COV-2 RDRP RESP QL NAA+PROBE: NEGATIVE

## 2022-07-02 PROCEDURE — 99213 OFFICE O/P EST LOW 20 MIN: CPT | Mod: ,,, | Performed by: NURSE PRACTITIONER

## 2022-07-02 PROCEDURE — U0002 COVID-19 LAB TEST NON-CDC: HCPCS | Mod: QW,CR,, | Performed by: NURSE PRACTITIONER

## 2022-07-02 PROCEDURE — 99213 PR OFFICE/OUTPT VISIT, EST, LEVL III, 20-29 MIN: ICD-10-PCS | Mod: ,,, | Performed by: NURSE PRACTITIONER

## 2022-07-02 PROCEDURE — U0002: ICD-10-PCS | Mod: QW,CR,, | Performed by: NURSE PRACTITIONER

## 2022-07-02 RX ORDER — METHYLPREDNISOLONE 4 MG/1
TABLET ORAL
Qty: 21 EACH | Refills: 0 | Status: SHIPPED | OUTPATIENT
Start: 2022-07-02 | End: 2023-06-19

## 2022-07-02 RX ORDER — AZITHROMYCIN 250 MG/1
TABLET, FILM COATED ORAL
Qty: 6 TABLET | Refills: 0 | Status: SHIPPED | OUTPATIENT
Start: 2022-07-02 | End: 2022-12-15

## 2022-07-02 NOTE — PROGRESS NOTES
"Subjective:       Patient ID: Tania Velasco is a 66 y.o. female.    Vitals:  height is 5' 2" (1.575 m) and weight is 49.9 kg (110 lb). Her tympanic temperature is 101.1 °F (38.4 °C) (abnormal). Her blood pressure is 140/63 (abnormal) and her pulse is 74. Her respiration is 20 and oxygen saturation is 100%.     Chief Complaint: Cough    66-year-old female presents with sinus congestion, postnasal drip, and cough.  No shortness of breath or fever.  Onset several days ago.    Cough  Associated symptoms include postnasal drip. Associated symptoms comments: Sinus pressure.       HENT: Positive for postnasal drip.    Respiratory: Positive for cough.        Objective:      Physical Exam   Constitutional: She is oriented to person, place, and time. She appears well-developed. She is cooperative.  Non-toxic appearance. She does not appear ill. No distress.   HENT:   Head: Normocephalic and atraumatic.   Ears:   Right Ear: Hearing, tympanic membrane, external ear and ear canal normal.   Left Ear: Hearing, tympanic membrane, external ear and ear canal normal.   Nose: Nose normal. No mucosal edema, rhinorrhea or nasal deformity. No epistaxis. Right sinus exhibits no maxillary sinus tenderness and no frontal sinus tenderness. Left sinus exhibits no maxillary sinus tenderness and no frontal sinus tenderness.   Mouth/Throat: Uvula is midline, oropharynx is clear and moist and mucous membranes are normal. No trismus in the jaw. Normal dentition. No uvula swelling. No oropharyngeal exudate, posterior oropharyngeal edema or posterior oropharyngeal erythema.   Eyes: Conjunctivae and lids are normal. No scleral icterus.   Neck: Trachea normal and phonation normal. Neck supple. No edema present. No erythema present. No neck rigidity present.   Cardiovascular: Normal rate, regular rhythm, normal heart sounds and normal pulses.   Pulmonary/Chest: Effort normal and breath sounds normal. No respiratory distress. She has no decreased " breath sounds. She has no rhonchi.   Abdominal: Normal appearance.   Musculoskeletal: Normal range of motion.         General: No deformity. Normal range of motion.   Neurological: She is alert and oriented to person, place, and time. She exhibits normal muscle tone. Coordination normal.   Skin: Skin is warm, dry, intact, not diaphoretic and not pale.   Psychiatric: Her speech is normal and behavior is normal. Judgment and thought content normal.   Nursing note and vitals reviewed.        Assessment:       1. Cough    2. Bronchitis          Plan:     Increase oral fluids.  Take Z-Rodger and Medrol Dosepak as directed  Tylenol or ibuprofen OTC for pain as directed.  Mucinex OTC for cough as directed  Complete antibiotics as directed.  Will notify of the final radiology x-ray report results      Cough  -     POCT COVID-19 Rapid Screening    Bronchitis  -     XR CHEST PA AND LATERAL; Future; Expected date: 07/02/2022  -     azithromycin (Z-RDOGER) 250 MG tablet; Take 2 tablets by mouth on day 1; Take 1 tablet by mouth on days 2-5  Dispense: 6 tablet; Refill: 0  -     methylPREDNISolone (MEDROL DOSEPACK) 4 mg tablet; use as directed  Dispense: 21 each; Refill: 0

## 2022-07-02 NOTE — PATIENT INSTRUCTIONS
Increase oral fluids.  Take Z-Ronak and Medrol Dosepak as directed  Tylenol or ibuprofen OTC for pain as directed.  Mucinex OTC for cough as directed  Complete antibiotics as directed.  Will notify of the final radiology x-ray report results

## 2022-07-03 ENCOUNTER — TELEPHONE (OUTPATIENT)
Dept: URGENT CARE | Facility: CLINIC | Age: 67
End: 2022-07-03
Payer: COMMERCIAL

## 2022-07-03 NOTE — TELEPHONE ENCOUNTER
----- Message from Justo Allison NP sent at 7/3/2022  8:03 AM CDT -----  Chest x-ray.  No acute cardiopulmonary findings.  Please inform the patient of the x-ray report results.  Follow discharge instructions.

## 2022-09-12 ENCOUNTER — TELEPHONE (OUTPATIENT)
Dept: HEMATOLOGY/ONCOLOGY | Facility: CLINIC | Age: 67
End: 2022-09-12
Payer: COMMERCIAL

## 2022-09-12 NOTE — TELEPHONE ENCOUNTER
Patient states GYN, Dr. Adames asked that she contact our office for evaluation. He found a LN to left axilla. This was her routine yearly exam. She denies any tenderness in that area. Please advise.

## 2022-12-07 ENCOUNTER — LAB VISIT (OUTPATIENT)
Dept: LAB | Facility: HOSPITAL | Age: 67
End: 2022-12-07
Attending: INTERNAL MEDICINE
Payer: MEDICARE

## 2022-12-07 DIAGNOSIS — Z17.1 MALIGNANT NEOPLASM OF LOWER-OUTER QUADRANT OF LEFT BREAST OF FEMALE, ESTROGEN RECEPTOR NEGATIVE: ICD-10-CM

## 2022-12-07 DIAGNOSIS — C50.512 MALIGNANT NEOPLASM OF LOWER-OUTER QUADRANT OF LEFT BREAST OF FEMALE, ESTROGEN RECEPTOR NEGATIVE: ICD-10-CM

## 2022-12-07 LAB
ALBUMIN SERPL-MCNC: 3.7 GM/DL (ref 3.4–4.8)
ALBUMIN/GLOB SERPL: 1.2 RATIO (ref 1.1–2)
ALP SERPL-CCNC: 40 UNIT/L (ref 40–150)
ALT SERPL-CCNC: 15 UNIT/L (ref 0–55)
AST SERPL-CCNC: 24 UNIT/L (ref 5–34)
BASOPHILS # BLD AUTO: 0.04 X10(3)/MCL (ref 0–0.2)
BASOPHILS NFR BLD AUTO: 0.8 %
BILIRUBIN DIRECT+TOT PNL SERPL-MCNC: 0.4 MG/DL
BUN SERPL-MCNC: 20.1 MG/DL (ref 9.8–20.1)
CALCIUM SERPL-MCNC: 9.6 MG/DL (ref 8.4–10.2)
CEA SERPL-MCNC: <1.73 NG/ML (ref 0–3)
CHLORIDE SERPL-SCNC: 104 MMOL/L (ref 98–107)
CO2 SERPL-SCNC: 29 MMOL/L (ref 23–31)
CREAT SERPL-MCNC: 0.86 MG/DL (ref 0.55–1.02)
EOSINOPHIL # BLD AUTO: 0.14 X10(3)/MCL (ref 0–0.9)
EOSINOPHIL NFR BLD AUTO: 2.7 %
ERYTHROCYTE [DISTWIDTH] IN BLOOD BY AUTOMATED COUNT: 14.6 % (ref 11.5–17)
GFR SERPLBLD CREATININE-BSD FMLA CKD-EPI: >60 MLS/MIN/1.73/M2
GLOBULIN SER-MCNC: 3.1 GM/DL (ref 2.4–3.5)
GLUCOSE SERPL-MCNC: 97 MG/DL (ref 82–115)
HCT VFR BLD AUTO: 41.3 % (ref 37–47)
HGB BLD-MCNC: 12.9 GM/DL (ref 12–16)
IMM GRANULOCYTES # BLD AUTO: 0.01 X10(3)/MCL (ref 0–0.04)
IMM GRANULOCYTES NFR BLD AUTO: 0.2 %
LYMPHOCYTES # BLD AUTO: 2.33 X10(3)/MCL (ref 0.6–4.6)
LYMPHOCYTES NFR BLD AUTO: 44.2 %
MCH RBC QN AUTO: 28.2 PG (ref 27–31)
MCHC RBC AUTO-ENTMCNC: 31.2 MG/DL (ref 33–36)
MCV RBC AUTO: 90.4 FL (ref 80–94)
MONOCYTES # BLD AUTO: 0.43 X10(3)/MCL (ref 0.1–1.3)
MONOCYTES NFR BLD AUTO: 8.2 %
NEUTROPHILS # BLD AUTO: 2.3 X10(3)/MCL (ref 2.1–9.2)
NEUTROPHILS NFR BLD AUTO: 43.9 %
PLATELET # BLD AUTO: 253 X10(3)/MCL (ref 130–400)
PMV BLD AUTO: 9.1 FL (ref 7.4–10.4)
POTASSIUM SERPL-SCNC: 5.1 MMOL/L (ref 3.5–5.1)
PROT SERPL-MCNC: 6.8 GM/DL (ref 5.8–7.6)
RBC # BLD AUTO: 4.57 X10(6)/MCL (ref 4.2–5.4)
SODIUM SERPL-SCNC: 139 MMOL/L (ref 136–145)
WBC # SPEC AUTO: 5.3 X10(3)/MCL (ref 4.5–11.5)

## 2022-12-07 PROCEDURE — 85025 COMPLETE CBC W/AUTO DIFF WBC: CPT

## 2022-12-07 PROCEDURE — 86300 IMMUNOASSAY TUMOR CA 15-3: CPT

## 2022-12-07 PROCEDURE — 80053 COMPREHEN METABOLIC PANEL: CPT

## 2022-12-07 PROCEDURE — 36415 COLL VENOUS BLD VENIPUNCTURE: CPT

## 2022-12-07 PROCEDURE — 82378 CARCINOEMBRYONIC ANTIGEN: CPT

## 2022-12-08 LAB — CANCER AG27-29 SERPL-ACNC: 13.5 U/ML

## 2022-12-14 NOTE — PROGRESS NOTES
Subjective:       Patient ID: Tania Velasco is a 67 y.o. female.    Chief Complaint:  No new problems    HPI  Diagnosis: Stage IA left breast cancer 10/19 (T1a N0 M0) 0.4 cm, GII, ER/WI negative, HER-2 negative                    Extensive DCIS left breast                    S/p bilateral mastectomies                    History of stage IA right breast cancer 10/08 (T1b N0 M0), 0.8 cm, GIII, ER/WI +, HER-2 negative                    Postmenopausal s/p hysterectomy           + COVID-19 vaccination    Current Treatment: Observation; Raloxifene for bone density    Clinical History:  Patient has a history of stage IA right breast cancer 10/08 s/p lumpectomy and sentinel lymph node dissection. Pathology showed a grade 3 infiltrating ductal carcinoma measuring 0.8 cm and associated DCIS. 2 sentinel lymph nodes were negative for involvement. Tumor was ER/WI positive, HER-2 negative. She apparently had a low risk Oncotype score. She received adjuvant radiation therapy and Tamoxifen for 5 years. She was under the care of Dr. Sage Oneill.    She had an abnormal annual mammogram 9/16/19 at [facility] showing suspicious calcifications in the left lower breast at 6:00. Core biopsy 9/20/19 was positive for high-grade DCIS, focally suspicious for microinvasion. Estrogen and progesterone receptors were negative. Bilateral breast MRI 10/7/19 showed an area of non-mass enhancement in the lower outer quadrant of the left breast at the site of the biopsy clip measuring 2.1 x 0.8 cm. There is no suspicious axillary or internal mammary adenopathy. Right breast showed no suspicious lesions or enhancement. She was seen as a new patient 10/29/19. She was very nervous and worried about her new diagnosis of DCIS. She had multiple questions regarding prognosis and adjuvant treatment options, the majority of which could not be answered until final pathology was obtained. She lost her  to a primary mediastinal germ cell tumor.  She underwent a left breast lumpectomy and sentinel lymph node dissection 10/30/19.    Final pathology showed a 0.4 cm grade 2, triple negative infiltrating ductal carcinoma and extensive DCIS. Initial resection margins showed invasive carcinoma less than 1 mm from the medial and anterior margins. Additional resection showed negative margins for invasive carcinoma but focally positive for DCIS. 5 sentinel lymph nodes were negative for involvement. She saw Dr. Becerril for a second surgical opinion. She elected to proceed with bilateral mastectomies 12/6/19. Left breast showed a residual focus of DCIS measuring 1.0 cm adjacent to the previous biopsy site. There was no evidence of residual invasive carcinoma. Right breast showed benign pathologic changes. Given her small tumor size and mastectomy, there were no indications for adjuvant chemotherapy or radiation. Genetic testing showed no significant mutations.    She had an area of darkened skin adjacent to the left mastectomy incision.  She was seen by Surgery and Dermatology.  Findings were not suspicious for disease recurrence.  Punch biopsies were performed which revealed scar tissue with no evidence of malignancy.  She was placed on Raloxifene for treatment of her bone density by Gynecology.      Interval History  Mrs. Velasco is here today for six-month breast cancer surveillance visit.  She is now over 3 years out from her diagnosis of left breast cancer.  She denies any changes to her self-breast examination.  She is still followed annually by Surgical Oncology for a clinical exam.  She continues on raloxifene for treatment of her bone density.  Follow-up bone density exam 10/22 with gynecology showed improved bone density of the lumbar spine with a T-score of-2.3, previously -2.7.  Bone density of the left femoral neck was stable at -2.3, previously-2.4 and slightly progressed in the total hip with a T-score of-2, previously -1.7.  Recommendations were to  "continue the raloxifene.  She does not wish to consider Prolia therapy when it is warranted.  She is taking calcium and vitamin-D and also performing daily weight-bearing activities.  Laboratory for this visit was entirely normal and reviewed today with the patient.  Bone density results were also reviewed and discussed with the patient.    Review of Systems   Constitutional:  Negative for appetite change, fatigue and fever.   HENT:  Negative for mouth sores, sore throat and trouble swallowing.    Eyes: Negative.    Respiratory:  Negative for cough, chest tightness and shortness of breath.    Cardiovascular:  Negative for chest pain, palpitations and leg swelling.   Gastrointestinal:  Negative for abdominal distention, abdominal pain, blood in stool, change in bowel habit, constipation, diarrhea, nausea, vomiting and change in bowel habit.   Genitourinary:  Negative for dysuria, frequency and urgency.   Musculoskeletal:  Negative for arthralgias and back pain.   Integumentary:  Negative for rash and mole/lesion.   Hematological:  Negative for adenopathy. Does not bruise/bleed easily.   PMHx:  Right breast cancer, glaucoma, GERD, cataracts, osteoporosis  Menarche age 11, first pregnancy 27, 3 children (+ breast fed), hysterectomy 39, menopause 46, no HRT  PSHx:  Left breast fibroadenoma resection, right lumpectomy, bilateral breast biopsies, hysterectomy, thyroid nodule resection,  x3, glaucoma surgery, left lumpectomy, bilateral mastectomies  SH:  Lifetime nonsmoker, occasional alcohol use. Lives in [place] with her . 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 Her paternal grandmother had ovarian cancer in a paternal aunt had liver cancer       Objective:        /70 (BP Location: Right arm, Patient Position: Sitting, BP Method: Medium (Automatic))   Pulse (!) 50   Temp 97.9 °F (36.6 °C)   Resp 18   Ht 5' 2" (1.575 m)   Wt 51.3 kg (113 lb 3.2 oz)  "  SpO2 99%   BMI 20.70 kg/m²    Physical Exam  Constitutional:       Appearance: Normal appearance.      Comments: Thin white female in NAD.   HENT:      Head: Normocephalic.      Nose: Nose normal.      Mouth/Throat:      Mouth: Mucous membranes are moist.      Pharynx: Oropharynx is clear. No posterior oropharyngeal erythema.   Eyes:      Extraocular Movements: Extraocular movements intact.      Conjunctiva/sclera: Conjunctivae normal.      Pupils: Pupils are equal, round, and reactive to light.   Cardiovascular:      Rate and Rhythm: Normal rate and regular rhythm.      Heart sounds: No murmur heard.  Pulmonary:      Breath sounds: Normal breath sounds.   Chest:      Comments: Well healed bilateral mastectomy incisions. 2 cm area of thickened skin adjacent to left incision (unchanged). No suspicious masses or axillary nodes bilaterally.  Abdominal:      General: Abdomen is flat. Bowel sounds are normal. There is no distension.      Palpations: Abdomen is soft. There is no mass.      Tenderness: There is no abdominal tenderness.   Musculoskeletal:         General: No swelling or tenderness. Normal range of motion.      Cervical back: Neck supple. No tenderness.   Lymphadenopathy:      Cervical: No cervical adenopathy.   Skin:     General: Skin is warm and dry.      Findings: No rash.   Neurological:      General: No focal deficit present.      Mental Status: She is alert and oriented to person, place, and time.      Cranial Nerves: No cranial nerve deficit.      Gait: Gait normal.     ECOG SCORE    0 - Fully active-able to carry on all pre-disease performance without restriction          LABORATORY  Recent Results (from the past 336 hour(s))   Comprehensive Metabolic Panel    Collection Time: 12/07/22  8:01 AM   Result Value Ref Range    Sodium Level 139 136 - 145 mmol/L    Potassium Level 5.1 3.5 - 5.1 mmol/L    Chloride 104 98 - 107 mmol/L    Carbon Dioxide 29 23 - 31 mmol/L    Glucose Level 97 82 - 115 mg/dL     Blood Urea Nitrogen 20.1 9.8 - 20.1 mg/dL    Creatinine 0.86 0.55 - 1.02 mg/dL    Calcium Level Total 9.6 8.4 - 10.2 mg/dL    Protein Total 6.8 5.8 - 7.6 gm/dL    Albumin Level 3.7 3.4 - 4.8 gm/dL    Globulin 3.1 2.4 - 3.5 gm/dL    Albumin/Globulin Ratio 1.2 1.1 - 2.0 ratio    Bilirubin Total 0.4 <=1.5 mg/dL    Alkaline Phosphatase 40 40 - 150 unit/L    Alanine Aminotransferase 15 0 - 55 unit/L    Aspartate Aminotransferase 24 5 - 34 unit/L    eGFR >60 mls/min/1.73/m2   CEA (in-house)    Collection Time: 12/07/22  8:01 AM   Result Value Ref Range    Carcinoembryonic Antigen <1.73 0.00 - 3.00 ng/mL   Cancer Antigen 27-29    Collection Time: 12/07/22  8:01 AM   Result Value Ref Range    Breast Carcinoma Assoc Ag(CA 27.29) 13.5 <=38.0 U/mL   CBC with Differential    Collection Time: 12/07/22  8:01 AM   Result Value Ref Range    WBC 5.3 4.5 - 11.5 x10(3)/mcL    RBC 4.57 4.20 - 5.40 x10(6)/mcL    Hgb 12.9 12.0 - 16.0 gm/dL    Hct 41.3 37.0 - 47.0 %    MCV 90.4 80.0 - 94.0 fL    MCH 28.2 27.0 - 31.0 pg    MCHC 31.2 (L) 33.0 - 36.0 mg/dL    RDW 14.6 11.5 - 17.0 %    Platelet 253 130 - 400 x10(3)/mcL    MPV 9.1 7.4 - 10.4 fL    Neut % 43.9 %    Lymph % 44.2 %    Mono % 8.2 %    Eos % 2.7 %    Basophil % 0.8 %    Lymph # 2.33 0.6 - 4.6 x10(3)/mcL    Neut # 2.3 2.1 - 9.2 x10(3)/mcL    Mono # 0.43 0.1 - 1.3 x10(3)/mcL    Eos # 0.14 0 - 0.9 x10(3)/mcL    Baso # 0.04 0 - 0.2 x10(3)/mcL    IG# 0.01 0 - 0.04 x10(3)/mcL    IG% 0.2 %           Assessment:   Stage IA triple negative left breast cancer 10/19 - JAYSON  Extensive DCIS left breast  S/p bilateral mastectomies  History of stage IA right breast cancer 10/0 8  Postmenopausal s/p hysterectomy      Plan:   Patient has no suspicious findings on clinical exam or laboratory.    She will continue raloxifene for treatment of her bone density as recommended by Gynecology.  Agree with these recommendations.    Return to clinic in 6 months for ongoing breast cancer surveillance, or  sooner if needed.  All questions answered to the satisfaction of the patient.    AV MARX, FNP-C  Hematology/Oncology  Cancer Center [facility] [place]     Other Physicians  Dr. Kahlil De Oliveira

## 2022-12-15 ENCOUNTER — OFFICE VISIT (OUTPATIENT)
Dept: HEMATOLOGY/ONCOLOGY | Facility: CLINIC | Age: 67
End: 2022-12-15
Payer: MEDICARE

## 2022-12-15 VITALS
WEIGHT: 113.19 LBS | BODY MASS INDEX: 20.83 KG/M2 | RESPIRATION RATE: 18 BRPM | SYSTOLIC BLOOD PRESSURE: 110 MMHG | TEMPERATURE: 98 F | OXYGEN SATURATION: 99 % | HEART RATE: 50 BPM | HEIGHT: 62 IN | DIASTOLIC BLOOD PRESSURE: 70 MMHG

## 2022-12-15 DIAGNOSIS — Z17.1 MALIGNANT NEOPLASM OF LOWER-OUTER QUADRANT OF LEFT BREAST OF FEMALE, ESTROGEN RECEPTOR NEGATIVE: Primary | ICD-10-CM

## 2022-12-15 DIAGNOSIS — C50.512 MALIGNANT NEOPLASM OF LOWER-OUTER QUADRANT OF LEFT BREAST OF FEMALE, ESTROGEN RECEPTOR NEGATIVE: Primary | ICD-10-CM

## 2022-12-15 PROCEDURE — 99214 OFFICE O/P EST MOD 30 MIN: CPT | Mod: PBBFAC | Performed by: NURSE PRACTITIONER

## 2022-12-15 PROCEDURE — 99999 PR PBB SHADOW E&M-EST. PATIENT-LVL IV: CPT | Mod: PBBFAC,,, | Performed by: NURSE PRACTITIONER

## 2022-12-15 PROCEDURE — 99214 OFFICE O/P EST MOD 30 MIN: CPT | Mod: S$PBB,,, | Performed by: NURSE PRACTITIONER

## 2022-12-15 PROCEDURE — 99214 PR OFFICE/OUTPT VISIT, EST, LEVL IV, 30-39 MIN: ICD-10-PCS | Mod: S$PBB,,, | Performed by: NURSE PRACTITIONER

## 2022-12-15 PROCEDURE — 99999 PR PBB SHADOW E&M-EST. PATIENT-LVL IV: ICD-10-PCS | Mod: PBBFAC,,, | Performed by: NURSE PRACTITIONER

## 2022-12-15 RX ORDER — OFLOXACIN 3 MG/ML
SOLUTION/ DROPS OPHTHALMIC
COMMUNITY
Start: 2022-12-07

## 2023-06-07 ENCOUNTER — LAB VISIT (OUTPATIENT)
Dept: LAB | Facility: HOSPITAL | Age: 68
End: 2023-06-07
Attending: FAMILY MEDICINE
Payer: MEDICARE

## 2023-06-07 DIAGNOSIS — C50.512 MALIGNANT NEOPLASM OF LOWER-OUTER QUADRANT OF LEFT BREAST OF FEMALE, ESTROGEN RECEPTOR NEGATIVE: ICD-10-CM

## 2023-06-07 DIAGNOSIS — Z17.1 MALIGNANT NEOPLASM OF LOWER-OUTER QUADRANT OF LEFT BREAST OF FEMALE, ESTROGEN RECEPTOR NEGATIVE: ICD-10-CM

## 2023-06-07 LAB
ALBUMIN SERPL-MCNC: 3.8 G/DL (ref 3.4–4.8)
ALBUMIN/GLOB SERPL: 1.3 RATIO (ref 1.1–2)
ALP SERPL-CCNC: 44 UNIT/L (ref 40–150)
ALT SERPL-CCNC: 16 UNIT/L (ref 0–55)
AST SERPL-CCNC: 23 UNIT/L (ref 5–34)
BASOPHILS # BLD AUTO: 0.07 X10(3)/MCL
BASOPHILS NFR BLD AUTO: 0.9 %
BILIRUBIN DIRECT+TOT PNL SERPL-MCNC: 0.3 MG/DL
BUN SERPL-MCNC: 21.2 MG/DL (ref 9.8–20.1)
CALCIUM SERPL-MCNC: 9.5 MG/DL (ref 8.4–10.2)
CEA SERPL-MCNC: <1.73 NG/ML (ref 0–3)
CHLORIDE SERPL-SCNC: 102 MMOL/L (ref 98–107)
CO2 SERPL-SCNC: 32 MMOL/L (ref 23–31)
CREAT SERPL-MCNC: 0.86 MG/DL (ref 0.55–1.02)
EOSINOPHIL # BLD AUTO: 0.19 X10(3)/MCL (ref 0–0.9)
EOSINOPHIL NFR BLD AUTO: 2.6 %
ERYTHROCYTE [DISTWIDTH] IN BLOOD BY AUTOMATED COUNT: 13.9 % (ref 11.5–17)
GFR SERPLBLD CREATININE-BSD FMLA CKD-EPI: >60 MLS/MIN/1.73/M2
GLOBULIN SER-MCNC: 3 GM/DL (ref 2.4–3.5)
GLUCOSE SERPL-MCNC: 85 MG/DL (ref 82–115)
HCT VFR BLD AUTO: 37.8 % (ref 37–47)
HGB BLD-MCNC: 12 G/DL (ref 12–16)
IMM GRANULOCYTES # BLD AUTO: 0 X10(3)/MCL (ref 0–0.04)
IMM GRANULOCYTES NFR BLD AUTO: 0 %
LYMPHOCYTES # BLD AUTO: 2.74 X10(3)/MCL (ref 0.6–4.6)
LYMPHOCYTES NFR BLD AUTO: 37 %
MCH RBC QN AUTO: 29.6 PG (ref 27–31)
MCHC RBC AUTO-ENTMCNC: 31.7 G/DL (ref 33–36)
MCV RBC AUTO: 93.3 FL (ref 80–94)
MONOCYTES # BLD AUTO: 0.54 X10(3)/MCL (ref 0.1–1.3)
MONOCYTES NFR BLD AUTO: 7.3 %
NEUTROPHILS # BLD AUTO: 3.87 X10(3)/MCL (ref 2.1–9.2)
NEUTROPHILS NFR BLD AUTO: 52.2 %
PLATELET # BLD AUTO: 252 X10(3)/MCL (ref 130–400)
PMV BLD AUTO: 9.5 FL (ref 7.4–10.4)
POTASSIUM SERPL-SCNC: 4.7 MMOL/L (ref 3.5–5.1)
PROT SERPL-MCNC: 6.8 GM/DL (ref 5.8–7.6)
RBC # BLD AUTO: 4.05 X10(6)/MCL (ref 4.2–5.4)
SODIUM SERPL-SCNC: 143 MMOL/L (ref 136–145)
WBC # SPEC AUTO: 7.41 X10(3)/MCL (ref 4.5–11.5)

## 2023-06-07 PROCEDURE — 85025 COMPLETE CBC W/AUTO DIFF WBC: CPT

## 2023-06-07 PROCEDURE — 86300 IMMUNOASSAY TUMOR CA 15-3: CPT

## 2023-06-07 PROCEDURE — 80053 COMPREHEN METABOLIC PANEL: CPT

## 2023-06-07 PROCEDURE — 82378 CARCINOEMBRYONIC ANTIGEN: CPT

## 2023-06-07 PROCEDURE — 36415 COLL VENOUS BLD VENIPUNCTURE: CPT

## 2023-06-15 LAB — CANCER AG27-29 SERPL-ACNC: 13.9 U/ML

## 2023-06-16 NOTE — PROGRESS NOTES
Subjective:       Patient ID: Tania Velasco is a 67 y.o. female.    Chief Complaint:  Here for follow-up visit    HPI  Diagnosis: Stage IA left breast cancer 10/19 (T1a N0 M0) 0.4 cm, GII, ER/WV negative, HER-2 negative                    Extensive DCIS left breast                    S/p bilateral mastectomies                    History of stage IA right breast cancer 10/08 (T1b N0 M0), 0.8 cm, GIII, ER/WV +, HER-2 negative                    Postmenopausal s/p hysterectomy           + COVID-19 vaccination    Current Treatment: Observation; Raloxifene for bone density    Clinical History:  Patient has a history of stage IA right breast cancer 10/08 s/p lumpectomy and sentinel lymph node dissection. Pathology showed a grade 3 infiltrating ductal carcinoma measuring 0.8 cm and associated DCIS. 2 sentinel lymph nodes were negative for involvement. Tumor was ER/WV positive, HER-2 negative. She apparently had a low risk Oncotype score. She received adjuvant radiation therapy and Tamoxifen for 5 years. She was under the care of Dr. Sage Oneill.    She had an abnormal annual mammogram 9/16/19 at [facility] showing suspicious calcifications in the left lower breast at 6:00. Core biopsy 9/20/19 was positive for high-grade DCIS, focally suspicious for microinvasion. Estrogen and progesterone receptors were negative. Bilateral breast MRI 10/7/19 showed an area of non-mass enhancement in the lower outer quadrant of the left breast at the site of the biopsy clip measuring 2.1 x 0.8 cm. There is no suspicious axillary or internal mammary adenopathy. Right breast showed no suspicious lesions or enhancement. She was seen as a new patient 10/29/19. She was very nervous and worried about her new diagnosis of DCIS. She had multiple questions regarding prognosis and adjuvant treatment options, the majority of which could not be answered until final pathology was obtained. She lost her  to a primary mediastinal germ cell  tumor. She underwent a left breast lumpectomy and sentinel lymph node dissection 10/30/19.    Final pathology showed a 0.4 cm grade 2, triple negative infiltrating ductal carcinoma and extensive DCIS. Initial resection margins showed invasive carcinoma less than 1 mm from the medial and anterior margins. Additional resection showed negative margins for invasive carcinoma but focally positive for DCIS. 5 sentinel lymph nodes were negative for involvement. She saw Dr. Becerril for a second surgical opinion. She elected to proceed with bilateral mastectomies 12/6/19. Left breast showed a residual focus of DCIS measuring 1.0 cm adjacent to the previous biopsy site. There was no evidence of residual invasive carcinoma. Right breast showed benign pathologic changes. Given her small tumor size and mastectomy, there were no indications for adjuvant chemotherapy or radiation. Genetic testing showed no significant mutations.    She had an area of darkened skin adjacent to the left mastectomy incision.  She was seen by Surgery and Dermatology.  Findings were not suspicious for disease recurrence.  Punch biopsies were performed which revealed scar tissue with no evidence of malignancy.  She was placed on Raloxifene for treatment of her bone density by Gynecology.    Bone density exam 10/22 showed improved bone density of the lumbar spine with a T-score of-2.3, previously -2.7.  Bone density of the left femoral neck was stable at -2.3, previously-2.4 and slightly progressed in the total hip with a T-score of-2, previously -1.7.  Recommendations were to continue the raloxifene.       Colonoscopy 1/22- normal (Dr. Bharathi Shen).    Interval History  Mrs. Velasco is here today for six-month breast cancer surveillance visit.  She feels well.  She reports a good appetite and energy level.  She works out at [employer] several days per week.  She is also busy spending time with her family and new grandchildren.  She denies any  "changes to her chest wall exam.  She continues on Raloxifene for her bone density.  Laboratory for this visit was normal and reviewed today with the patient.      Review of Systems   Constitutional:  Negative for appetite change, fatigue and fever.   HENT:  Negative for mouth sores, sore throat and trouble swallowing.    Eyes: Negative.    Respiratory:  Negative for cough, chest tightness and shortness of breath.    Cardiovascular:  Negative for chest pain, palpitations and leg swelling.   Gastrointestinal:  Negative for abdominal distention, abdominal pain, blood in stool, change in bowel habit, constipation, diarrhea, nausea, vomiting and change in bowel habit.   Genitourinary:  Negative for dysuria, frequency and urgency.   Musculoskeletal:  Negative for arthralgias and back pain.   Integumentary:  Negative for rash and mole/lesion.   Hematological:  Negative for adenopathy. Does not bruise/bleed easily.   PMHx:  Right breast cancer, glaucoma, GERD, cataracts, osteoporosis  Menarche age 11, first pregnancy 27, 3 children (+ breast fed), hysterectomy 39, menopause 46, no HRT  PSHx:  Left breast fibroadenoma resection, right lumpectomy, bilateral breast biopsies, hysterectomy, thyroid nodule resection,  x3, glaucoma surgery, left lumpectomy, bilateral mastectomies  SH:  Lifetime nonsmoker, occasional alcohol use. 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 Her paternal grandmother had ovarian cancer in a paternal aunt had liver cancer       Objective:        /67   Pulse (!) 59   Temp 98.7 °F (37.1 °C)   Resp 16   Ht 5' 2" (1.575 m)   Wt 50.9 kg (112 lb 4.8 oz)   SpO2 100%   BMI 20.54 kg/m²    Physical Exam  Constitutional:       Appearance: Normal appearance.      Comments: Thin white female in NAD.   HENT:      Head: Normocephalic.      Nose: Nose normal.      Mouth/Throat:      Mouth: Mucous membranes are moist.    "   Pharynx: Oropharynx is clear. No posterior oropharyngeal erythema.   Eyes:      Extraocular Movements: Extraocular movements intact.      Conjunctiva/sclera: Conjunctivae normal.      Pupils: Pupils are equal, round, and reactive to light.   Cardiovascular:      Rate and Rhythm: Normal rate and regular rhythm.      Heart sounds: No murmur heard.  Pulmonary:      Breath sounds: Normal breath sounds.   Chest:      Comments: Well healed bilateral mastectomy incisions. 2 cm area of thickened skin adjacent to left incision (unchanged). No suspicious masses or axillary nodes bilaterally.  Abdominal:      General: Abdomen is flat. Bowel sounds are normal. There is no distension.      Palpations: Abdomen is soft. There is no mass.      Tenderness: There is no abdominal tenderness.   Musculoskeletal:         General: No swelling or tenderness. Normal range of motion.      Cervical back: Neck supple. No tenderness.   Lymphadenopathy:      Cervical: No cervical adenopathy.   Skin:     General: Skin is warm and dry.      Findings: No rash.   Neurological:      General: No focal deficit present.      Mental Status: She is alert and oriented to person, place, and time.      Cranial Nerves: No cranial nerve deficit.      Gait: Gait normal.     ECOG SCORE    0 - Fully active-able to carry on all pre-disease performance without restriction          LABORATORY  Recent Results (from the past 336 hour(s))   CEA    Collection Time: 06/07/23  3:23 PM   Result Value Ref Range    Carcinoembryonic Antigen <1.73 0.00 - 3.00 ng/mL   Comprehensive Metabolic Panel    Collection Time: 06/07/23  3:23 PM   Result Value Ref Range    Sodium Level 143 136 - 145 mmol/L    Potassium Level 4.7 3.5 - 5.1 mmol/L    Chloride 102 98 - 107 mmol/L    Carbon Dioxide 32 (H) 23 - 31 mmol/L    Glucose Level 85 82 - 115 mg/dL    Blood Urea Nitrogen 21.2 (H) 9.8 - 20.1 mg/dL    Creatinine 0.86 0.55 - 1.02 mg/dL    Calcium Level Total 9.5 8.4 - 10.2 mg/dL     Protein Total 6.8 5.8 - 7.6 gm/dL    Albumin Level 3.8 3.4 - 4.8 g/dL    Globulin 3.0 2.4 - 3.5 gm/dL    Albumin/Globulin Ratio 1.3 1.1 - 2.0 ratio    Bilirubin Total 0.3 <=1.5 mg/dL    Alkaline Phosphatase 44 40 - 150 unit/L    Alanine Aminotransferase 16 0 - 55 unit/L    Aspartate Aminotransferase 23 5 - 34 unit/L    eGFR >60 mls/min/1.73/m2   Cancer Antigen 27-29    Collection Time: 06/07/23  3:23 PM   Result Value Ref Range    Breast Carcinoma Assoc Ag(CA 27.29) 13.9 <=38.0 U/mL   CBC with Differential    Collection Time: 06/07/23  3:23 PM   Result Value Ref Range    WBC 7.41 4.50 - 11.50 x10(3)/mcL    RBC 4.05 (L) 4.20 - 5.40 x10(6)/mcL    Hgb 12.0 12.0 - 16.0 g/dL    Hct 37.8 37.0 - 47.0 %    MCV 93.3 80.0 - 94.0 fL    MCH 29.6 27.0 - 31.0 pg    MCHC 31.7 (L) 33.0 - 36.0 g/dL    RDW 13.9 11.5 - 17.0 %    Platelet 252 130 - 400 x10(3)/mcL    MPV 9.5 7.4 - 10.4 fL    Neut % 52.2 %    Lymph % 37.0 %    Mono % 7.3 %    Eos % 2.6 %    Basophil % 0.9 %    Lymph # 2.74 0.6 - 4.6 x10(3)/mcL    Neut # 3.87 2.1 - 9.2 x10(3)/mcL    Mono # 0.54 0.1 - 1.3 x10(3)/mcL    Eos # 0.19 0 - 0.9 x10(3)/mcL    Baso # 0.07 <=0.2 x10(3)/mcL    IG# 0.00 0 - 0.04 x10(3)/mcL    IG% 0.0 %           Assessment:   Stage IA triple negative left breast cancer 10/19 - JAYSON  Extensive DCIS left breast  S/p bilateral mastectomies  History of stage IA right breast cancer 10/0 8  Postmenopausal s/p hysterectomy      Plan:   Continue Raloxifene for treatment of bone density.  Next bone density exam due 10/24.  She has no suspicious findings on exam or laboratory.  Continue breast cancer surveillance every 6 months.  Patient in agreement.  All questions answered.    AV MARX, FNP-C  [place] [place]     Other Physicians  Dr. Kahlil De Oliveira

## 2023-06-19 ENCOUNTER — OFFICE VISIT (OUTPATIENT)
Dept: HEMATOLOGY/ONCOLOGY | Facility: CLINIC | Age: 68
End: 2023-06-19
Payer: MEDICARE

## 2023-06-19 VITALS
HEIGHT: 62 IN | BODY MASS INDEX: 20.67 KG/M2 | OXYGEN SATURATION: 100 % | SYSTOLIC BLOOD PRESSURE: 123 MMHG | HEART RATE: 59 BPM | RESPIRATION RATE: 16 BRPM | TEMPERATURE: 99 F | DIASTOLIC BLOOD PRESSURE: 67 MMHG | WEIGHT: 112.31 LBS

## 2023-06-19 DIAGNOSIS — Z17.1 MALIGNANT NEOPLASM OF LOWER-OUTER QUADRANT OF LEFT BREAST OF FEMALE, ESTROGEN RECEPTOR NEGATIVE: Primary | ICD-10-CM

## 2023-06-19 DIAGNOSIS — C50.512 MALIGNANT NEOPLASM OF LOWER-OUTER QUADRANT OF LEFT BREAST OF FEMALE, ESTROGEN RECEPTOR NEGATIVE: Primary | ICD-10-CM

## 2023-06-19 PROCEDURE — 99213 OFFICE O/P EST LOW 20 MIN: CPT | Mod: S$PBB,,, | Performed by: NURSE PRACTITIONER

## 2023-06-19 PROCEDURE — 99999 PR PBB SHADOW E&M-EST. PATIENT-LVL III: CPT | Mod: PBBFAC,,, | Performed by: NURSE PRACTITIONER

## 2023-06-19 PROCEDURE — 99213 OFFICE O/P EST LOW 20 MIN: CPT | Mod: PBBFAC | Performed by: NURSE PRACTITIONER

## 2023-06-19 PROCEDURE — 99999 PR PBB SHADOW E&M-EST. PATIENT-LVL III: ICD-10-PCS | Mod: PBBFAC,,, | Performed by: NURSE PRACTITIONER

## 2023-06-19 PROCEDURE — 99213 PR OFFICE/OUTPT VISIT, EST, LEVL III, 20-29 MIN: ICD-10-PCS | Mod: S$PBB,,, | Performed by: NURSE PRACTITIONER

## 2023-12-23 ENCOUNTER — OFFICE VISIT (OUTPATIENT)
Dept: URGENT CARE | Facility: CLINIC | Age: 68
End: 2023-12-23
Payer: MEDICARE

## 2023-12-23 VITALS
RESPIRATION RATE: 18 BRPM | SYSTOLIC BLOOD PRESSURE: 102 MMHG | OXYGEN SATURATION: 97 % | HEART RATE: 72 BPM | DIASTOLIC BLOOD PRESSURE: 65 MMHG | TEMPERATURE: 99 F | BODY MASS INDEX: 20.61 KG/M2 | HEIGHT: 62 IN | WEIGHT: 112 LBS

## 2023-12-23 DIAGNOSIS — J02.0 STREP THROAT: Primary | ICD-10-CM

## 2023-12-23 LAB
CTP QC/QA: YES
MOLECULAR STREP A: POSITIVE
POC MOLECULAR INFLUENZA A AGN: NEGATIVE
POC MOLECULAR INFLUENZA B AGN: NEGATIVE
SARS-COV-2 RDRP RESP QL NAA+PROBE: NEGATIVE

## 2023-12-23 PROCEDURE — 87651 POCT STREP A MOLECULAR: ICD-10-PCS | Mod: QW,,, | Performed by: FAMILY MEDICINE

## 2023-12-23 PROCEDURE — 87502 INFLUENZA DNA AMP PROBE: CPT | Mod: QW,,, | Performed by: FAMILY MEDICINE

## 2023-12-23 PROCEDURE — 87651 STREP A DNA AMP PROBE: CPT | Mod: QW,,, | Performed by: FAMILY MEDICINE

## 2023-12-23 PROCEDURE — 99213 PR OFFICE/OUTPT VISIT, EST, LEVL III, 20-29 MIN: ICD-10-PCS | Mod: ,,, | Performed by: FAMILY MEDICINE

## 2023-12-23 PROCEDURE — 87635: ICD-10-PCS | Mod: QW,,, | Performed by: FAMILY MEDICINE

## 2023-12-23 PROCEDURE — 87502 POCT INFLUENZA A/B MOLECULAR: ICD-10-PCS | Mod: QW,,, | Performed by: FAMILY MEDICINE

## 2023-12-23 PROCEDURE — 99213 OFFICE O/P EST LOW 20 MIN: CPT | Mod: ,,, | Performed by: FAMILY MEDICINE

## 2023-12-23 PROCEDURE — 87635 SARS-COV-2 COVID-19 AMP PRB: CPT | Mod: QW,,, | Performed by: FAMILY MEDICINE

## 2023-12-23 RX ORDER — AMOXICILLIN 500 MG/1
500 TABLET, FILM COATED ORAL EVERY 12 HOURS
Qty: 20 TABLET | Refills: 0 | Status: SHIPPED | OUTPATIENT
Start: 2023-12-23 | End: 2024-01-02

## 2023-12-23 NOTE — PATIENT INSTRUCTIONS
Amoxicillin twice daily for 10    Drink plenty of fluids.      Get plenty of rest.      Tylenol or Motrin as needed.      Go to the ER with any significant change or worsening of symptoms.     Follow up with your primary care doctor.

## 2023-12-23 NOTE — PROGRESS NOTES
Patient ID: [id number]     Chief Complaint: upper respiratory tract infection symptoms    History of Present Illness:     Tania Velasco is a 68 y.o. female  with a history of breast cancer status post bilateral mastectomies,, glaucoma, osteoporosis, who presents today for symptoms of Sinus Problem (Pt presents to clinic with c/o sinus pressure, sore throat, bilateral ear pressure, fever starting yesterday. )      Pt denies experiencing any fevers, chills, nausea, vomiting, difficulty breathing, dysphagia, or neck stiffness.    Past Medical History:     ----------------------------  Breast cancer  GERD (gastroesophageal reflux disease)  Glaucoma  Osteoporosis     Past Surgical History:   Procedure Laterality Date    Pedro breast biopsies      BILATERAL MASTECTOMY      BREAST LUMPECTOMY Right     BREAST LUMPECTOMY Left      SECTION      X 3    HYSTERECTOMY      Left breast fibroadenoma resection Left     Thyroid nodule resection         Review of patient's allergies indicates:   Allergen Reactions    Codeine        Outpatient Medications Marked as Taking for the 23 encounter (Office Visit) with Balaji Awad MD   Medication Sig Dispense Refill    beta-glucan,1-3, 1-4, bulk, 70 % Powd by Other route.      raloxifene (EVISTA) 60 mg tablet Take 60 mg by mouth once daily.      timolol maleate 0.5% (TIMOPTIC) 0.5 % Drop Place 1 drop into both eyes 2 (two) times daily.      XELPROS 0.005 % dpem INSTILL 1 DROP DAILY IN EACH EYE AT BEDTIME         Social History     Socioeconomic History    Marital status: Single   Occupational History    Occupation: Retired hospital pharmacist   Tobacco Use    Smoking status: Never    Smokeless tobacco: Never   Substance and Sexual Activity    Alcohol use: Yes     Comment: occasional        Family History   Problem Relation Age of Onset    Ovarian cancer Paternal Grandmother     Breast cancer Maternal Aunt     Liver cancer Paternal Aunt         Subjective:     Review of  Systems   Constitutional:  Negative for chills, fever and malaise/fatigue.   HENT:  Positive for congestion, ear pain, sinus pain and sore throat. Negative for ear discharge.    Respiratory:  Negative for cough, sputum production, shortness of breath, wheezing and stridor.    Gastrointestinal:  Negative for abdominal pain, diarrhea, nausea and vomiting.   Genitourinary:  Negative for dysuria, frequency and urgency.   Musculoskeletal:  Negative for neck pain.   Skin:  Negative for rash.   Neurological:  Negative for headaches.       Objective:     Vitals:    12/23/23 1006   BP: 102/65   Pulse: 72   Resp: 18   Temp: 99.3 °F (37.4 °C)     Body mass index is 20.49 kg/m².    Physical Exam  Constitutional:       General: She is not in acute distress.     Appearance: Normal appearance. She is not ill-appearing or toxic-appearing.   HENT:      Head: Normocephalic and atraumatic.      Right Ear: Tympanic membrane and ear canal normal.      Left Ear: Tympanic membrane and ear canal normal.      Nose: No congestion or rhinorrhea.      Mouth/Throat:      Pharynx: Oropharynx is clear. No oropharyngeal exudate or posterior oropharyngeal erythema.      Comments: Minimal posterior oropharynx erythema, no sign of abscess, airway patent  Eyes:      General:         Right eye: No discharge.         Left eye: No discharge.      Extraocular Movements: Extraocular movements intact.      Conjunctiva/sclera: Conjunctivae normal.   Cardiovascular:      Rate and Rhythm: Normal rate and regular rhythm.      Heart sounds: Normal heart sounds. No murmur heard.     No gallop.   Pulmonary:      Effort: Pulmonary effort is normal. No respiratory distress.      Breath sounds: Normal breath sounds. No stridor. No wheezing, rhonchi or rales.   Chest:      Chest wall: No tenderness.   Musculoskeletal:      Cervical back: No rigidity or tenderness.   Lymphadenopathy:      Cervical: No cervical adenopathy.   Neurological:      Mental Status: She is  alert and oriented to person, place, and time. Mental status is at baseline.   Psychiatric:         Mood and Affect: Mood normal.         Behavior: Behavior normal.         Assessment & Plan:       ICD-10-CM ICD-9-CM   1. Strep throat  J02.0 034.0        1. Strep throat  -     POCT COVID-19 Rapid Screening  -     POCT Influenza A/B Molecular  -     POCT Strep A, Molecular    Other orders  -     amoxicillin (AMOXIL) 500 MG Tab; Take 1 tablet (500 mg total) by mouth every 12 (twelve) hours. for 10 days  Dispense: 20 tablet; Refill: 0         Strep positive, Influenza negative, and Covid negative.  Normal exam, no acute distress, vitals stable.  No allergies to penicillins.  We discussed warning signs and symptoms to monitor for and to seek medical care if they emerge. Pt will return  if symptoms change, worsen, or do not resolved within the expected time range.

## 2024-01-05 ENCOUNTER — LAB VISIT (OUTPATIENT)
Dept: LAB | Facility: HOSPITAL | Age: 69
End: 2024-01-05
Attending: FAMILY MEDICINE
Payer: MEDICARE

## 2024-01-05 DIAGNOSIS — C50.512 MALIGNANT NEOPLASM OF LOWER-OUTER QUADRANT OF LEFT BREAST OF FEMALE, ESTROGEN RECEPTOR NEGATIVE: ICD-10-CM

## 2024-01-05 DIAGNOSIS — Z17.1 MALIGNANT NEOPLASM OF LOWER-OUTER QUADRANT OF LEFT BREAST OF FEMALE, ESTROGEN RECEPTOR NEGATIVE: ICD-10-CM

## 2024-01-05 LAB
ALBUMIN SERPL-MCNC: 3.7 G/DL (ref 3.4–4.8)
ALBUMIN/GLOB SERPL: 1.2 RATIO (ref 1.1–2)
ALP SERPL-CCNC: 46 UNIT/L (ref 40–150)
ALT SERPL-CCNC: 20 UNIT/L (ref 0–55)
AST SERPL-CCNC: 22 UNIT/L (ref 5–34)
BASOPHILS # BLD AUTO: 0.06 X10(3)/MCL
BASOPHILS NFR BLD AUTO: 0.8 %
BILIRUB SERPL-MCNC: 0.4 MG/DL
BUN SERPL-MCNC: 18.2 MG/DL (ref 9.8–20.1)
CALCIUM SERPL-MCNC: 9.3 MG/DL (ref 8.4–10.2)
CEA SERPL-MCNC: <1.73 NG/ML (ref 0–3)
CHLORIDE SERPL-SCNC: 102 MMOL/L (ref 98–107)
CO2 SERPL-SCNC: 27 MMOL/L (ref 23–31)
CREAT SERPL-MCNC: 0.81 MG/DL (ref 0.55–1.02)
EOSINOPHIL # BLD AUTO: 0.17 X10(3)/MCL (ref 0–0.9)
EOSINOPHIL NFR BLD AUTO: 2.2 %
ERYTHROCYTE [DISTWIDTH] IN BLOOD BY AUTOMATED COUNT: 14.3 % (ref 11.5–17)
GFR SERPLBLD CREATININE-BSD FMLA CKD-EPI: >60 MLS/MIN/1.73/M2
GLOBULIN SER-MCNC: 3.2 GM/DL (ref 2.4–3.5)
GLUCOSE SERPL-MCNC: 86 MG/DL (ref 82–115)
HCT VFR BLD AUTO: 38.8 % (ref 37–47)
HGB BLD-MCNC: 12.6 G/DL (ref 12–16)
IMM GRANULOCYTES # BLD AUTO: 0.04 X10(3)/MCL (ref 0–0.04)
IMM GRANULOCYTES NFR BLD AUTO: 0.5 %
LYMPHOCYTES # BLD AUTO: 2.16 X10(3)/MCL (ref 0.6–4.6)
LYMPHOCYTES NFR BLD AUTO: 28.4 %
MCH RBC QN AUTO: 29.6 PG (ref 27–31)
MCHC RBC AUTO-ENTMCNC: 32.5 G/DL (ref 33–36)
MCV RBC AUTO: 91.1 FL (ref 80–94)
MONOCYTES # BLD AUTO: 0.4 X10(3)/MCL (ref 0.1–1.3)
MONOCYTES NFR BLD AUTO: 5.3 %
NEUTROPHILS # BLD AUTO: 4.78 X10(3)/MCL (ref 2.1–9.2)
NEUTROPHILS NFR BLD AUTO: 62.8 %
PLATELET # BLD AUTO: 361 X10(3)/MCL (ref 130–400)
PMV BLD AUTO: 8.8 FL (ref 7.4–10.4)
POTASSIUM SERPL-SCNC: 5 MMOL/L (ref 3.5–5.1)
PROT SERPL-MCNC: 6.9 GM/DL (ref 5.8–7.6)
RBC # BLD AUTO: 4.26 X10(6)/MCL (ref 4.2–5.4)
SODIUM SERPL-SCNC: 140 MMOL/L (ref 136–145)
WBC # SPEC AUTO: 7.61 X10(3)/MCL (ref 4.5–11.5)

## 2024-01-05 PROCEDURE — 82378 CARCINOEMBRYONIC ANTIGEN: CPT

## 2024-01-05 PROCEDURE — 85025 COMPLETE CBC W/AUTO DIFF WBC: CPT

## 2024-01-05 PROCEDURE — 36415 COLL VENOUS BLD VENIPUNCTURE: CPT

## 2024-01-05 PROCEDURE — 80053 COMPREHEN METABOLIC PANEL: CPT

## 2024-01-05 PROCEDURE — 86300 IMMUNOASSAY TUMOR CA 15-3: CPT

## 2024-01-08 PROBLEM — C50.512 MALIGNANT NEOPLASM OF LOWER-OUTER QUADRANT OF LEFT BREAST OF FEMALE, ESTROGEN RECEPTOR NEGATIVE: Status: ACTIVE | Noted: 2024-01-08

## 2024-01-08 PROBLEM — Z17.1 MALIGNANT NEOPLASM OF LOWER-OUTER QUADRANT OF LEFT BREAST OF FEMALE, ESTROGEN RECEPTOR NEGATIVE: Status: ACTIVE | Noted: 2024-01-08

## 2024-01-08 LAB — CANCER AG27-29 SERPL-ACNC: 21.3 U/ML

## 2024-01-08 NOTE — PROGRESS NOTES
Subjective:       Patient ID: Tania Velasco is a 68 y.o. female.    Chief Complaint:  No new problems  HPI  Diagnosis: Stage IA left breast cancer 10/19 (T1a N0 M0) 0.4 cm, GII, ER/VT negative, HER-2 negative                    Extensive DCIS left breast                    S/p bilateral mastectomies                    History of stage IA right breast cancer 10/08 (T1b N0 M0), 0.8 cm, GIII, ER/VT +, HER-2 negative                    Postmenopausal s/p hysterectomy           + COVID-19 vaccination    Current Treatment: Observation; Raloxifene for bone density    Clinical History:  Patient has a history of stage IA right breast cancer 10/08 s/p lumpectomy and sentinel lymph node dissection. Pathology showed a grade 3 infiltrating ductal carcinoma measuring 0.8 cm and associated DCIS. 2 sentinel lymph nodes were negative for involvement. Tumor was ER/VT positive, HER-2 negative. She apparently had a low risk Oncotype score. She received adjuvant radiation therapy and Tamoxifen for 5 years. She was under the care of Dr. Sage Oneill.    She had an abnormal annual mammogram 9/16/19 at [facility] showing suspicious calcifications in the left lower breast at 6:00. Core biopsy 9/20/19 was positive for high-grade DCIS, focally suspicious for microinvasion. Estrogen and progesterone receptors were negative. Bilateral breast MRI 10/7/19 showed an area of non-mass enhancement in the lower outer quadrant of the left breast at the site of the biopsy clip measuring 2.1 x 0.8 cm. There is no suspicious axillary or internal mammary adenopathy. Right breast showed no suspicious lesions or enhancement. She was seen as a new patient 10/29/19. She was very nervous and worried about her new diagnosis of DCIS. She had multiple questions regarding prognosis and adjuvant treatment options, the majority of which could not be answered until final pathology was obtained. She lost her  to a primary mediastinal germ cell tumor. She  underwent a left breast lumpectomy and sentinel lymph node dissection 10/30/19.    Final pathology showed a 0.4 cm grade 2, triple negative infiltrating ductal carcinoma and extensive DCIS. Initial resection margins showed invasive carcinoma less than 1 mm from the medial and anterior margins. Additional resection showed negative margins for invasive carcinoma but focally positive for DCIS. 5 sentinel lymph nodes were negative for involvement. She saw Dr. Becerril for a second surgical opinion. She elected to proceed with bilateral mastectomies 12/6/19. Left breast showed a residual focus of DCIS measuring 1.0 cm adjacent to the previous biopsy site. There was no evidence of residual invasive carcinoma. Right breast showed benign pathologic changes. Given her small tumor size and mastectomy, there were no indications for adjuvant chemotherapy or radiation. Genetic testing showed no significant mutations.    She had an area of darkened skin adjacent to the left mastectomy incision.  She was seen by Surgery and Dermatology.  Findings were not suspicious for disease recurrence.  Punch biopsies were performed which revealed scar tissue with no evidence of malignancy.  She was placed on Raloxifene for treatment of her bone density by Gynecology.    Bone density exam 10/22 showed improved bone density of the lumbar spine with a T-score of-2.3, previously -2.7.  Bone density of the left femoral neck was stable at -2.3, previously-2.4 and slightly progressed in the total hip with a T-score of-2, previously -1.7.  Recommendations were to continue the raloxifene.       Colonoscopy 1/22- normal (Dr. Bharathi Shen).    Interval History  Mrs. Velasco is here today by herself for a six month breast cancer surveillance visit.  She had strep throat a few weeks ago, but has otherwise been well.  She denies any changes to her self breast exam s/p bilateral mastectomies.  She continues on Raloxifene daily for treatment of her bone  "density.  She had a bone density exam with Gynecology last year.  I have requested a copy for our record.  Laboratory for this visit was unremarkable, including LFTs and serum tumor markers, and reviewed today with the patient.  She recently participated in a community health screening with her [employer] and there was an abnormality noted on the carotid US.  She is requesting a referral to Cardiology for further evaluation.      Review of Systems   Constitutional:  Negative for appetite change, fatigue and fever.   HENT:  Negative for mouth sores, sore throat and trouble swallowing.    Eyes: Negative.    Respiratory:  Negative for cough, chest tightness and shortness of breath.    Cardiovascular:  Negative for chest pain, palpitations and leg swelling.   Gastrointestinal:  Negative for abdominal distention, abdominal pain, blood in stool, change in bowel habit, constipation, diarrhea, nausea and vomiting.   Genitourinary:  Negative for dysuria, frequency and urgency.   Musculoskeletal:  Negative for arthralgias and back pain.   Integumentary:  Negative for rash and mole/lesion.   Hematological:  Negative for adenopathy. Does not bruise/bleed easily.     PMHx:  Right breast cancer, glaucoma, GERD, cataracts, osteoporosis  Menarche age 11, first pregnancy 27, 3 children (+ breast fed), hysterectomy 39, menopause 46, no HRT  PSHx:  Left breast fibroadenoma resection, right lumpectomy, bilateral breast biopsies, hysterectomy, thyroid nodule resection,  x3, glaucoma surgery, left lumpectomy, bilateral mastectomies  SH:  Lifetime nonsmoker, occasional alcohol use. Lives in [place] with her . 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 Her paternal grandmother had ovarian cancer in a paternal aunt had liver cancer       Objective:        BP 94/61   Pulse (!) 55   Temp 98.7 °F (37.1 °C)   Resp 14   Ht 5' 2" (1.575 m)   Wt 51 kg (112 lb 6.4 oz)   SpO2 98%   BMI " 20.56 kg/m²    Physical Exam  Constitutional:       Appearance: Normal appearance.      Comments: Thin white female in NAD.   HENT:      Head: Normocephalic.      Nose: Nose normal.      Mouth/Throat:      Mouth: Mucous membranes are moist.      Pharynx: Oropharynx is clear. No posterior oropharyngeal erythema.   Eyes:      Extraocular Movements: Extraocular movements intact.      Conjunctiva/sclera: Conjunctivae normal.      Pupils: Pupils are equal, round, and reactive to light.   Cardiovascular:      Rate and Rhythm: Normal rate and regular rhythm.      Heart sounds: No murmur heard.  Pulmonary:      Breath sounds: Normal breath sounds.   Chest:      Comments: Well healed bilateral mastectomy incisions. 2 cm area of thickened skin adjacent to left incision (unchanged). No suspicious masses or axillary nodes bilaterally.  Abdominal:      General: Abdomen is flat. Bowel sounds are normal. There is no distension.      Palpations: Abdomen is soft. There is no mass.      Tenderness: There is no abdominal tenderness.   Musculoskeletal:         General: No swelling or tenderness. Normal range of motion.      Cervical back: Neck supple. No tenderness.   Lymphadenopathy:      Cervical: No cervical adenopathy.   Skin:     General: Skin is warm and dry.      Findings: No rash.   Neurological:      General: No focal deficit present.      Mental Status: She is alert and oriented to person, place, and time.      Cranial Nerves: No cranial nerve deficit.      Gait: Gait normal.       ECOG SCORE    0 - Fully active-able to carry on all pre-disease performance without restriction          LABORATORY  Recent Results (from the past 336 hour(s))   CEA    Collection Time: 01/05/24  9:01 AM   Result Value Ref Range    Carcinoembryonic Antigen <1.73 0.00 - 3.00 ng/mL   Cancer Antigen 27-29    Collection Time: 01/05/24  9:01 AM   Result Value Ref Range    Breast Carcinoma Assoc Ag(CA 27.29) 21.3 <=38.0 U/mL   Comprehensive Metabolic  Panel    Collection Time: 01/05/24  9:01 AM   Result Value Ref Range    Sodium Level 140 136 - 145 mmol/L    Potassium Level 5.0 3.5 - 5.1 mmol/L    Chloride 102 98 - 107 mmol/L    Carbon Dioxide 27 23 - 31 mmol/L    Glucose Level 86 82 - 115 mg/dL    Blood Urea Nitrogen 18.2 9.8 - 20.1 mg/dL    Creatinine 0.81 0.55 - 1.02 mg/dL    Calcium Level Total 9.3 8.4 - 10.2 mg/dL    Protein Total 6.9 5.8 - 7.6 gm/dL    Albumin Level 3.7 3.4 - 4.8 g/dL    Globulin 3.2 2.4 - 3.5 gm/dL    Albumin/Globulin Ratio 1.2 1.1 - 2.0 ratio    Bilirubin Total 0.4 <=1.5 mg/dL    Alkaline Phosphatase 46 40 - 150 unit/L    Alanine Aminotransferase 20 0 - 55 unit/L    Aspartate Aminotransferase 22 5 - 34 unit/L    eGFR >60 mls/min/1.73/m2   CBC with Differential    Collection Time: 01/05/24  9:01 AM   Result Value Ref Range    WBC 7.61 4.50 - 11.50 x10(3)/mcL    RBC 4.26 4.20 - 5.40 x10(6)/mcL    Hgb 12.6 12.0 - 16.0 g/dL    Hct 38.8 37.0 - 47.0 %    MCV 91.1 80.0 - 94.0 fL    MCH 29.6 27.0 - 31.0 pg    MCHC 32.5 (L) 33.0 - 36.0 g/dL    RDW 14.3 11.5 - 17.0 %    Platelet 361 130 - 400 x10(3)/mcL    MPV 8.8 7.4 - 10.4 fL    Neut % 62.8 %    Lymph % 28.4 %    Mono % 5.3 %    Eos % 2.2 %    Basophil % 0.8 %    Lymph # 2.16 0.6 - 4.6 x10(3)/mcL    Neut # 4.78 2.1 - 9.2 x10(3)/mcL    Mono # 0.40 0.1 - 1.3 x10(3)/mcL    Eos # 0.17 0 - 0.9 x10(3)/mcL    Baso # 0.06 <=0.2 x10(3)/mcL    IG# 0.04 0 - 0.04 x10(3)/mcL    IG% 0.5 %           Assessment:   Stage IA triple negative left breast cancer 10/19 - JAYSON  Extensive DCIS left breast  S/p bilateral mastectomies  History of stage IA right breast cancer 10/08  Postmenopausal s/p hysterectomy    Plan:   Continue Raloxifene for treatment of bone density.    Obtain copy of most recent bone density for our record/review.  She was due for follow-up 10/24, but will not repeat if it was done last year.  She has no suspicious findings on her physical exam or in her laboratory studies.  RTC 6 months for  routine breast cancer surveillance.  CBC, CMP, CEA and CA 27-29 level with that visit.  She will be 5 years out from surgical resection of her left breast cancer at that time.  Patient in agreement.  All questions answered.    AV MARX, FNP-C  [facility] [place]     Other Physicians  Dr. Kahlil De Oliveira

## 2024-01-10 ENCOUNTER — OFFICE VISIT (OUTPATIENT)
Dept: HEMATOLOGY/ONCOLOGY | Facility: CLINIC | Age: 69
End: 2024-01-10
Payer: MEDICARE

## 2024-01-10 VITALS
TEMPERATURE: 99 F | HEART RATE: 55 BPM | WEIGHT: 112.38 LBS | SYSTOLIC BLOOD PRESSURE: 94 MMHG | OXYGEN SATURATION: 98 % | DIASTOLIC BLOOD PRESSURE: 61 MMHG | BODY MASS INDEX: 20.68 KG/M2 | HEIGHT: 62 IN | RESPIRATION RATE: 14 BRPM

## 2024-01-10 DIAGNOSIS — Z17.1 MALIGNANT NEOPLASM OF LOWER-OUTER QUADRANT OF LEFT BREAST OF FEMALE, ESTROGEN RECEPTOR NEGATIVE: Primary | ICD-10-CM

## 2024-01-10 DIAGNOSIS — M85.80 OSTEOPENIA, UNSPECIFIED LOCATION: ICD-10-CM

## 2024-01-10 DIAGNOSIS — C50.512 MALIGNANT NEOPLASM OF LOWER-OUTER QUADRANT OF LEFT BREAST OF FEMALE, ESTROGEN RECEPTOR NEGATIVE: Primary | ICD-10-CM

## 2024-01-10 PROCEDURE — 99214 OFFICE O/P EST MOD 30 MIN: CPT | Mod: PBBFAC | Performed by: NURSE PRACTITIONER

## 2024-01-10 PROCEDURE — 99999 PR PBB SHADOW E&M-EST. PATIENT-LVL IV: CPT | Mod: PBBFAC,,, | Performed by: NURSE PRACTITIONER

## 2024-01-10 PROCEDURE — 99213 OFFICE O/P EST LOW 20 MIN: CPT | Mod: S$PBB,,, | Performed by: NURSE PRACTITIONER

## 2024-03-26 ENCOUNTER — OFFICE VISIT (OUTPATIENT)
Dept: URGENT CARE | Facility: CLINIC | Age: 69
End: 2024-03-26
Payer: MEDICARE

## 2024-03-26 VITALS
OXYGEN SATURATION: 99 % | HEIGHT: 62 IN | DIASTOLIC BLOOD PRESSURE: 62 MMHG | RESPIRATION RATE: 18 BRPM | HEART RATE: 58 BPM | SYSTOLIC BLOOD PRESSURE: 99 MMHG | WEIGHT: 112 LBS | TEMPERATURE: 99 F | BODY MASS INDEX: 20.61 KG/M2

## 2024-03-26 DIAGNOSIS — R05.1 ACUTE COUGH: Primary | ICD-10-CM

## 2024-03-26 DIAGNOSIS — J06.9 VIRAL UPPER RESPIRATORY TRACT INFECTION: ICD-10-CM

## 2024-03-26 PROCEDURE — 96372 THER/PROPH/DIAG INJ SC/IM: CPT | Mod: ,,, | Performed by: NURSE PRACTITIONER

## 2024-03-26 PROCEDURE — 99213 OFFICE O/P EST LOW 20 MIN: CPT | Mod: 25,,, | Performed by: NURSE PRACTITIONER

## 2024-03-26 RX ORDER — BETAMETHASONE SODIUM PHOSPHATE AND BETAMETHASONE ACETATE 3; 3 MG/ML; MG/ML
6 INJECTION, SUSPENSION INTRA-ARTICULAR; INTRALESIONAL; INTRAMUSCULAR; SOFT TISSUE
Status: COMPLETED | OUTPATIENT
Start: 2024-03-26 | End: 2024-03-26

## 2024-03-26 RX ADMIN — BETAMETHASONE SODIUM PHOSPHATE AND BETAMETHASONE ACETATE 6 MG: 3; 3 INJECTION, SUSPENSION INTRA-ARTICULAR; INTRALESIONAL; INTRAMUSCULAR; SOFT TISSUE at 09:03

## 2024-03-26 NOTE — PROGRESS NOTES
"Subjective:      Patient ID: Tania Velasco is a 68 y.o. female.    Vitals:  height is 5' 2" (1.575 m) and weight is 50.8 kg (112 lb). Her temperature is 98.8 °F (37.1 °C). Her blood pressure is 99/62 and her pulse is 58 (abnormal). Her respiration is 18 and oxygen saturation is 99%.     Chief Complaint: Cough (Pt presents to clinic with c/o cough that has got worse in the last couple of days, sinus pressure, ear pressure starting last week. Hx of Bronchitis. Pt has tried mucinex and Claritin. )    68-year-old female presents with sinus congestion, pressure, and cough.  Onset 1 week ago.  Minimal relief using OTC medication and Claritin.  No shortness of breath or reports of fever    Cough    HENT:  Positive for congestion and sinus pressure.    Respiratory:  Positive for cough.     Objective:     Physical Exam   Constitutional: She is oriented to person, place, and time. She appears well-developed. She is cooperative.  Non-toxic appearance. She does not appear ill. No distress.   HENT:   Head: Normocephalic and atraumatic.   Ears:   Right Ear: Hearing, tympanic membrane, external ear and ear canal normal.   Left Ear: Hearing, tympanic membrane, external ear and ear canal normal.   Nose: Nose normal. No mucosal edema, rhinorrhea or nasal deformity. No epistaxis. Right sinus exhibits no maxillary sinus tenderness and no frontal sinus tenderness. Left sinus exhibits no maxillary sinus tenderness and no frontal sinus tenderness.   Mouth/Throat: Uvula is midline, oropharynx is clear and moist and mucous membranes are normal. No trismus in the jaw. Normal dentition. No uvula swelling. No oropharyngeal exudate, posterior oropharyngeal edema or posterior oropharyngeal erythema.   Eyes: Conjunctivae and lids are normal. No scleral icterus.   Neck: Trachea normal and phonation normal. Neck supple. No edema present. No erythema present. No neck rigidity present.   Cardiovascular: Normal rate, regular rhythm, normal heart " sounds and normal pulses.   Pulmonary/Chest: Effort normal and breath sounds normal. No respiratory distress. She has no decreased breath sounds. She has no rhonchi.   Abdominal: Normal appearance.   Musculoskeletal: Normal range of motion.         General: No deformity. Normal range of motion.   Neurological: She is alert and oriented to person, place, and time. She exhibits normal muscle tone. Coordination normal.   Skin: Skin is warm, dry, intact, not diaphoretic and not pale.   Psychiatric: Her speech is normal and behavior is normal. Judgment and thought content normal.   Nursing note and vitals reviewed.    Assessment:     1. Acute cough    2. Viral upper respiratory tract infection        Plan:   Nasal saline, Flonase nasal spray, Claritin OTC as directed  Take Tylenol or ibuprofen OTC for fever and pain as directed  take Mucinex OTC for cough as directed   follow up with your primary care provider within 2-5 days if your signs and symptoms have not resolved or worsen.   If condition worsens or fails to improve we recommend that you receive another evaluation with your primary medical clinic to discuss your concerns.      Acute cough  -     betamethasone acetate-betamethasone sodium phosphate injection 6 mg    Viral upper respiratory tract infection

## 2024-03-26 NOTE — PATIENT INSTRUCTIONS
Nasal saline, Flonase nasal spray, Claritin OTC as directed  Take Tylenol or ibuprofen OTC for fever and pain as directed  take Mucinex OTC for cough as directed   follow up with your primary care provider within 2-5 days if your signs and symptoms have not resolved or worsen.   If condition worsens or fails to improve we recommend that you receive another evaluation with your primary medical clinic to discuss your concerns.

## 2024-07-10 ENCOUNTER — LAB VISIT (OUTPATIENT)
Dept: LAB | Facility: HOSPITAL | Age: 69
End: 2024-07-10
Attending: NURSE PRACTITIONER
Payer: MEDICARE

## 2024-07-10 DIAGNOSIS — Z17.1 MALIGNANT NEOPLASM OF LOWER-OUTER QUADRANT OF LEFT BREAST OF FEMALE, ESTROGEN RECEPTOR NEGATIVE: ICD-10-CM

## 2024-07-10 DIAGNOSIS — C50.512 MALIGNANT NEOPLASM OF LOWER-OUTER QUADRANT OF LEFT BREAST OF FEMALE, ESTROGEN RECEPTOR NEGATIVE: ICD-10-CM

## 2024-07-10 DIAGNOSIS — M85.80 OSTEOPENIA, UNSPECIFIED LOCATION: ICD-10-CM

## 2024-07-10 LAB
ALBUMIN SERPL-MCNC: 3.7 G/DL (ref 3.4–4.8)
ALBUMIN/GLOB SERPL: 1.2 RATIO (ref 1.1–2)
ALP SERPL-CCNC: 44 UNIT/L (ref 40–150)
ALT SERPL-CCNC: 14 UNIT/L (ref 0–55)
ANION GAP SERPL CALC-SCNC: 7 MEQ/L
AST SERPL-CCNC: 21 UNIT/L (ref 5–34)
BASOPHILS # BLD AUTO: 0.05 X10(3)/MCL
BASOPHILS NFR BLD AUTO: 0.8 %
BILIRUB SERPL-MCNC: 0.4 MG/DL
BUN SERPL-MCNC: 17 MG/DL (ref 9.8–20.1)
CALCIUM SERPL-MCNC: 9.8 MG/DL (ref 8.4–10.2)
CEA SERPL-MCNC: <1.73 NG/ML (ref 0–3)
CHLORIDE SERPL-SCNC: 103 MMOL/L (ref 98–107)
CO2 SERPL-SCNC: 30 MMOL/L (ref 23–31)
CREAT SERPL-MCNC: 0.9 MG/DL (ref 0.55–1.02)
CREAT/UREA NIT SERPL: 19
EOSINOPHIL # BLD AUTO: 0.21 X10(3)/MCL (ref 0–0.9)
EOSINOPHIL NFR BLD AUTO: 3.6 %
ERYTHROCYTE [DISTWIDTH] IN BLOOD BY AUTOMATED COUNT: 14.4 % (ref 11.5–17)
GFR SERPLBLD CREATININE-BSD FMLA CKD-EPI: >60 ML/MIN/1.73/M2
GLOBULIN SER-MCNC: 3 GM/DL (ref 2.4–3.5)
GLUCOSE SERPL-MCNC: 101 MG/DL (ref 82–115)
HCT VFR BLD AUTO: 39.3 % (ref 37–47)
HGB BLD-MCNC: 12.8 G/DL (ref 12–16)
IMM GRANULOCYTES # BLD AUTO: 0 X10(3)/MCL (ref 0–0.04)
IMM GRANULOCYTES NFR BLD AUTO: 0 %
LYMPHOCYTES # BLD AUTO: 2.06 X10(3)/MCL (ref 0.6–4.6)
LYMPHOCYTES NFR BLD AUTO: 35 %
MCH RBC QN AUTO: 29.9 PG (ref 27–31)
MCHC RBC AUTO-ENTMCNC: 32.6 G/DL (ref 33–36)
MCV RBC AUTO: 91.8 FL (ref 80–94)
MONOCYTES # BLD AUTO: 0.42 X10(3)/MCL (ref 0.1–1.3)
MONOCYTES NFR BLD AUTO: 7.1 %
NEUTROPHILS # BLD AUTO: 3.15 X10(3)/MCL (ref 2.1–9.2)
NEUTROPHILS NFR BLD AUTO: 53.5 %
PLATELET # BLD AUTO: 290 X10(3)/MCL (ref 130–400)
PMV BLD AUTO: 8.9 FL (ref 7.4–10.4)
POTASSIUM SERPL-SCNC: 5 MMOL/L (ref 3.5–5.1)
PROT SERPL-MCNC: 6.7 GM/DL (ref 5.8–7.6)
RBC # BLD AUTO: 4.28 X10(6)/MCL (ref 4.2–5.4)
SODIUM SERPL-SCNC: 140 MMOL/L (ref 136–145)
WBC # BLD AUTO: 5.89 X10(3)/MCL (ref 4.5–11.5)

## 2024-07-10 PROCEDURE — 82378 CARCINOEMBRYONIC ANTIGEN: CPT

## 2024-07-10 PROCEDURE — 36415 COLL VENOUS BLD VENIPUNCTURE: CPT

## 2024-07-10 PROCEDURE — 86300 IMMUNOASSAY TUMOR CA 15-3: CPT

## 2024-07-10 PROCEDURE — 80053 COMPREHEN METABOLIC PANEL: CPT

## 2024-07-10 PROCEDURE — 85025 COMPLETE CBC W/AUTO DIFF WBC: CPT

## 2024-07-11 LAB — CANCER AG27-29 SERPL-ACNC: 18.6 U/ML

## 2024-07-11 NOTE — PROGRESS NOTES
Subjective:       Patient ID: Tania Velasco is a 68 y.o. female.    Chief Complaint:  I'm doing well    Diagnosis: Stage IA left breast cancer 10/19 (T1a N0 M0) 0.4 cm, GII, ER/GA negative, HER-2 negative                    Extensive DCIS left breast                    S/p bilateral mastectomies                    History of stage IA right breast cancer 10/08 (T1b N0 M0), 0.8 cm, GIII, ER/GA +, HER-2 negative                    Postmenopausal s/p hysterectomy    Current Treatment: Observation; Raloxifene for bone density    Clinical History:  Patient has a history of stage IA right breast cancer 10/08 s/p lumpectomy and sentinel lymph node dissection. Pathology showed a grade 3 infiltrating ductal carcinoma measuring 0.8 cm and associated DCIS. 2 sentinel lymph nodes were negative for involvement. Tumor was ER/GA positive, HER-2 negative. She apparently had a low risk Oncotype score. She received adjuvant radiation therapy and Tamoxifen for 5 years. She was under the care of Dr. Sage Oneill.    She had an abnormal annual mammogram 9/16/19 at [facility] showing suspicious calcifications in the left lower breast at 6:00. Core biopsy 9/20/19 was positive for high-grade DCIS, focally suspicious for microinvasion. Estrogen and progesterone receptors were negative. Bilateral breast MRI 10/7/19 showed an area of non-mass enhancement in the lower outer quadrant of the left breast at the site of the biopsy clip measuring 2.1 x 0.8 cm. There is no suspicious axillary or internal mammary adenopathy. Right breast showed no suspicious lesions or enhancement. She was seen as a new patient 10/29/19. She was very nervous and worried about her new diagnosis of DCIS. She had multiple questions regarding prognosis and adjuvant treatment options, the majority of which could not be answered until final pathology was obtained. She lost her  to a primary mediastinal germ cell tumor. She underwent a left breast lumpectomy  and sentinel lymph node dissection 10/30/19.    Final pathology showed a 0.4 cm grade 2, triple negative infiltrating ductal carcinoma and extensive DCIS. Initial resection margins showed invasive carcinoma less than 1 mm from the medial and anterior margins. Additional resection showed negative margins for invasive carcinoma but focally positive for DCIS. 5 sentinel lymph nodes were negative for involvement. She saw Dr. Becerril for a second surgical opinion. She elected to proceed with bilateral mastectomies 12/6/19. Left breast showed a residual focus of DCIS measuring 1.0 cm adjacent to the previous biopsy site. There was no evidence of residual invasive carcinoma. Right breast showed benign pathologic changes. Given her small tumor size and mastectomy, there were no indications for adjuvant chemotherapy or radiation. Genetic testing showed no significant mutations.    She had an area of darkened skin adjacent to the left mastectomy incision.  She was seen by Surgery and Dermatology.  Findings were not suspicious for disease recurrence.  Punch biopsies were performed which revealed scar tissue with no evidence of malignancy.  She was placed on Raloxifene for treatment of her bone density by Gynecology.    Bone density exam 10/22 showed improved bone density of the lumbar spine with a T-score of-2.3, previously -2.7.  Bone density of the left femoral neck was stable at -2.3, previously-2.4 and slightly progressed in the total hip with a T-score of -2, previously -1.7.  Recommendations were to continue the raloxifene.       Colonoscopy 1/22- normal (Dr. Bharathi Shen).    Interval History  She returns to the office today by herself for a six-month surveillance visit.  She reports no significant problems or illnesses since her last follow-up visit.  She did have some soreness under her left arm after doing some new exercises in her yoga class.  She has no suspicious findings on her clinical exam.  Laboratory testing  "for this visit showed no significant abnormalities.  She remains on raloxifene for treatment of her bone density.  She is due for a follow-up bone density exam later this year which is ordered by her gynecologist.      Review of Systems   Constitutional:  Negative for appetite change, fatigue and fever.   HENT:  Negative for mouth sores, sore throat and trouble swallowing.    Eyes: Negative.    Respiratory:  Negative for cough, chest tightness and shortness of breath.    Cardiovascular:  Negative for chest pain, palpitations and leg swelling.   Gastrointestinal:  Negative for abdominal distention, abdominal pain, blood in stool, change in bowel habit, constipation, diarrhea, nausea and vomiting.   Genitourinary:  Negative for dysuria, frequency and urgency.   Musculoskeletal:  Negative for arthralgias and back pain.   Integumentary:  Negative for rash and mole/lesion.   Hematological:  Negative for adenopathy. Does not bruise/bleed easily.       PMHx:  Right breast cancer, glaucoma, GERD, cataracts, osteoporosis  Menarche age 11, first pregnancy 27, 3 children (+ breast fed), hysterectomy 39, menopause 46, no HRT  PSHx:  Left breast fibroadenoma resection, right lumpectomy, bilateral breast biopsies, hysterectomy, thyroid nodule resection,  x3, glaucoma surgery, left lumpectomy, bilateral mastectomies  SH:  Lifetime nonsmoker, occasional alcohol use. Lives in [place] with her . 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 Her paternal grandmother had ovarian cancer in a paternal aunt had liver cancer     Objective:        /69   Pulse (!) 49   Resp 15   Ht 5' 1" (1.549 m)   Wt 51.1 kg (112 lb 9.6 oz)   SpO2 98%   BMI 21.28 kg/m²    Physical Exam  Constitutional:       Appearance: Normal appearance.      Comments: Thin white female in NAD.   HENT:      Head: Normocephalic.      Nose: Nose normal.      Mouth/Throat:      Mouth: Mucous membranes are " moist.      Pharynx: Oropharynx is clear. No posterior oropharyngeal erythema.   Eyes:      Extraocular Movements: Extraocular movements intact.      Conjunctiva/sclera: Conjunctivae normal.      Pupils: Pupils are equal, round, and reactive to light.   Cardiovascular:      Rate and Rhythm: Normal rate and regular rhythm.      Heart sounds: No murmur heard.  Pulmonary:      Breath sounds: Normal breath sounds.   Chest:      Comments: Well healed bilateral mastectomy incisions. 2 cm area of thickened skin adjacent to left incision (unchanged). No suspicious masses or axillary nodes bilaterally.  Abdominal:      General: Abdomen is flat. Bowel sounds are normal. There is no distension.      Palpations: Abdomen is soft. There is no mass.      Tenderness: There is no abdominal tenderness.   Musculoskeletal:         General: No swelling or tenderness. Normal range of motion.      Cervical back: Neck supple. No tenderness.   Lymphadenopathy:      Cervical: No cervical adenopathy.   Skin:     General: Skin is warm and dry.      Findings: No rash.   Neurological:      General: No focal deficit present.      Mental Status: She is alert and oriented to person, place, and time.      Cranial Nerves: No cranial nerve deficit.      Gait: Gait normal.       ECOG SCORE    0 - Fully active-able to carry on all pre-disease performance without restriction          LABORATORY  Recent Results (from the past 336 hour(s))   Cancer Antigen 27-29    Collection Time: 07/10/24  8:38 AM   Result Value Ref Range    Breast Carcinoma Assoc Ag(CA 27.29) 18.6 <=38.0 U/mL   CEA    Collection Time: 07/10/24  8:38 AM   Result Value Ref Range    Carcinoembryonic Antigen <1.73 0.00 - 3.00 ng/mL   Comprehensive Metabolic Panel    Collection Time: 07/10/24  8:38 AM   Result Value Ref Range    Sodium 140 136 - 145 mmol/L    Potassium 5.0 3.5 - 5.1 mmol/L    Chloride 103 98 - 107 mmol/L    CO2 30 23 - 31 mmol/L    Glucose 101 82 - 115 mg/dL    Blood Urea  Nitrogen 17.0 9.8 - 20.1 mg/dL    Creatinine 0.90 0.55 - 1.02 mg/dL    Calcium 9.8 8.4 - 10.2 mg/dL    Protein Total 6.7 5.8 - 7.6 gm/dL    Albumin 3.7 3.4 - 4.8 g/dL    Globulin 3.0 2.4 - 3.5 gm/dL    Albumin/Globulin Ratio 1.2 1.1 - 2.0 ratio    Bilirubin Total 0.4 <=1.5 mg/dL    ALP 44 40 - 150 unit/L    ALT 14 0 - 55 unit/L    AST 21 5 - 34 unit/L    eGFR >60 mL/min/1.73/m2    Anion Gap 7.0 mEq/L    BUN/Creatinine Ratio 19    CBC with Differential    Collection Time: 07/10/24  8:38 AM   Result Value Ref Range    WBC 5.89 4.50 - 11.50 x10(3)/mcL    RBC 4.28 4.20 - 5.40 x10(6)/mcL    Hgb 12.8 12.0 - 16.0 g/dL    Hct 39.3 37.0 - 47.0 %    MCV 91.8 80.0 - 94.0 fL    MCH 29.9 27.0 - 31.0 pg    MCHC 32.6 (L) 33.0 - 36.0 g/dL    RDW 14.4 11.5 - 17.0 %    Platelet 290 130 - 400 x10(3)/mcL    MPV 8.9 7.4 - 10.4 fL    Neut % 53.5 %    Lymph % 35.0 %    Mono % 7.1 %    Eos % 3.6 %    Basophil % 0.8 %    Lymph # 2.06 0.6 - 4.6 x10(3)/mcL    Neut # 3.15 2.1 - 9.2 x10(3)/mcL    Mono # 0.42 0.1 - 1.3 x10(3)/mcL    Eos # 0.21 0 - 0.9 x10(3)/mcL    Baso # 0.05 <=0.2 x10(3)/mcL    IG# 0.00 0 - 0.04 x10(3)/mcL    IG% 0.0 %           Assessment:   Stage IA triple negative left breast cancer 10/19 - JAYSON  Extensive DCIS left breast  S/p bilateral mastectomies  History of stage IA right breast cancer 10/08  Postmenopausal s/p hysterectomy    Plan:   Patient continues to do well with no suspicious findings on her clinical exam or laboratory.  Continue Raloxifene for treatment of bone density.    She will forward a copy of her next bone density report to my office.  Barring any problems, she will return in 1 year for a follow-up visit and clinical exam with repeat laboratory.      NYDIA HUGO MD    Other Physicians  Dr. Kahlil De Oliveira

## 2024-07-17 ENCOUNTER — OFFICE VISIT (OUTPATIENT)
Dept: HEMATOLOGY/ONCOLOGY | Facility: CLINIC | Age: 69
End: 2024-07-17
Payer: MEDICARE

## 2024-07-17 VITALS
HEART RATE: 49 BPM | SYSTOLIC BLOOD PRESSURE: 116 MMHG | BODY MASS INDEX: 21.27 KG/M2 | WEIGHT: 112.63 LBS | HEIGHT: 61 IN | DIASTOLIC BLOOD PRESSURE: 69 MMHG | RESPIRATION RATE: 15 BRPM | OXYGEN SATURATION: 98 %

## 2024-07-17 DIAGNOSIS — C50.512 MALIGNANT NEOPLASM OF LOWER-OUTER QUADRANT OF LEFT BREAST OF FEMALE, ESTROGEN RECEPTOR NEGATIVE: Primary | ICD-10-CM

## 2024-07-17 DIAGNOSIS — Z17.1 MALIGNANT NEOPLASM OF LOWER-OUTER QUADRANT OF LEFT BREAST OF FEMALE, ESTROGEN RECEPTOR NEGATIVE: Primary | ICD-10-CM

## 2024-07-17 PROCEDURE — 99999 PR PBB SHADOW E&M-EST. PATIENT-LVL III: CPT | Mod: PBBFAC,,, | Performed by: INTERNAL MEDICINE

## 2024-07-17 PROCEDURE — 99213 OFFICE O/P EST LOW 20 MIN: CPT | Mod: PBBFAC | Performed by: INTERNAL MEDICINE

## 2025-07-16 ENCOUNTER — LAB VISIT (OUTPATIENT)
Dept: LAB | Facility: HOSPITAL | Age: 70
End: 2025-07-16
Attending: INTERNAL MEDICINE
Payer: MEDICARE

## 2025-07-16 DIAGNOSIS — C50.512 MALIGNANT NEOPLASM OF LOWER-OUTER QUADRANT OF LEFT BREAST OF FEMALE, ESTROGEN RECEPTOR NEGATIVE: ICD-10-CM

## 2025-07-16 DIAGNOSIS — Z17.1 MALIGNANT NEOPLASM OF LOWER-OUTER QUADRANT OF LEFT BREAST OF FEMALE, ESTROGEN RECEPTOR NEGATIVE: ICD-10-CM

## 2025-07-16 LAB
ALBUMIN SERPL-MCNC: 3.6 G/DL (ref 3.4–4.8)
ALBUMIN/GLOB SERPL: 1 RATIO (ref 1.1–2)
ALP SERPL-CCNC: 47 UNIT/L (ref 40–150)
ALT SERPL-CCNC: 18 UNIT/L (ref 0–55)
ANION GAP SERPL CALC-SCNC: 9 MEQ/L
AST SERPL-CCNC: 26 UNIT/L (ref 11–45)
BASOPHILS # BLD AUTO: 0.06 X10(3)/MCL
BASOPHILS NFR BLD AUTO: 1 %
BILIRUB SERPL-MCNC: 0.3 MG/DL
BUN SERPL-MCNC: 19.4 MG/DL (ref 9.8–20.1)
CALCIUM SERPL-MCNC: 9.2 MG/DL (ref 8.4–10.2)
CEA SERPL-MCNC: <1.73 NG/ML (ref 0–3)
CHLORIDE SERPL-SCNC: 104 MMOL/L (ref 98–107)
CO2 SERPL-SCNC: 27 MMOL/L (ref 23–31)
CREAT SERPL-MCNC: 0.9 MG/DL (ref 0.55–1.02)
CREAT/UREA NIT SERPL: 22
EOSINOPHIL # BLD AUTO: 0.24 X10(3)/MCL (ref 0–0.9)
EOSINOPHIL NFR BLD AUTO: 3.9 %
ERYTHROCYTE [DISTWIDTH] IN BLOOD BY AUTOMATED COUNT: 14.2 % (ref 11.5–17)
GFR SERPLBLD CREATININE-BSD FMLA CKD-EPI: >60 ML/MIN/1.73/M2
GLOBULIN SER-MCNC: 3.6 GM/DL (ref 2.4–3.5)
GLUCOSE SERPL-MCNC: 123 MG/DL (ref 82–115)
HCT VFR BLD AUTO: 40.2 % (ref 37–47)
HGB BLD-MCNC: 13.2 G/DL (ref 12–16)
IMM GRANULOCYTES # BLD AUTO: 0.01 X10(3)/MCL (ref 0–0.04)
IMM GRANULOCYTES NFR BLD AUTO: 0.2 %
LYMPHOCYTES # BLD AUTO: 2.24 X10(3)/MCL (ref 0.6–4.6)
LYMPHOCYTES NFR BLD AUTO: 36.5 %
MCH RBC QN AUTO: 30.5 PG (ref 27–31)
MCHC RBC AUTO-ENTMCNC: 32.8 G/DL (ref 33–36)
MCV RBC AUTO: 92.8 FL (ref 80–94)
MONOCYTES # BLD AUTO: 0.41 X10(3)/MCL (ref 0.1–1.3)
MONOCYTES NFR BLD AUTO: 6.7 %
NEUTROPHILS # BLD AUTO: 3.18 X10(3)/MCL (ref 2.1–9.2)
NEUTROPHILS NFR BLD AUTO: 51.7 %
PLATELET # BLD AUTO: 256 X10(3)/MCL (ref 130–400)
PMV BLD AUTO: 8.9 FL (ref 7.4–10.4)
POTASSIUM SERPL-SCNC: 4.7 MMOL/L (ref 3.5–5.1)
PROT SERPL-MCNC: 7.2 GM/DL (ref 5.8–7.6)
RBC # BLD AUTO: 4.33 X10(6)/MCL (ref 4.2–5.4)
SODIUM SERPL-SCNC: 140 MMOL/L (ref 136–145)
WBC # BLD AUTO: 6.14 X10(3)/MCL (ref 4.5–11.5)

## 2025-07-16 PROCEDURE — 80053 COMPREHEN METABOLIC PANEL: CPT

## 2025-07-16 PROCEDURE — 36415 COLL VENOUS BLD VENIPUNCTURE: CPT

## 2025-07-16 PROCEDURE — 86300 IMMUNOASSAY TUMOR CA 15-3: CPT

## 2025-07-16 PROCEDURE — 82378 CARCINOEMBRYONIC ANTIGEN: CPT

## 2025-07-16 PROCEDURE — 85025 COMPLETE CBC W/AUTO DIFF WBC: CPT

## 2025-07-21 PROBLEM — C50.512 MALIGNANT NEOPLASM OF LOWER-OUTER QUADRANT OF LEFT BREAST OF FEMALE, ESTROGEN RECEPTOR NEGATIVE: Status: RESOLVED | Noted: 2024-01-08 | Resolved: 2025-07-21

## 2025-07-21 PROBLEM — Z17.1 MALIGNANT NEOPLASM OF LOWER-OUTER QUADRANT OF LEFT BREAST OF FEMALE, ESTROGEN RECEPTOR NEGATIVE: Status: RESOLVED | Noted: 2024-01-08 | Resolved: 2025-07-21

## 2025-07-21 PROBLEM — Z85.3 HISTORY OF LEFT BREAST CANCER: Status: ACTIVE | Noted: 2025-07-21

## 2025-07-21 NOTE — PROGRESS NOTES
Subjective:       Patient ID: Tania Velasco is a 69 y.o. female.    Chief Complaint:  No problems    Diagnosis: Stage IA left breast cancer 10/19 (T1a N0 M0) 0.4 cm, GII, ER/NE negative, HER-2 negative                    Extensive DCIS left breast                    S/p bilateral mastectomies                    History of stage IA right breast cancer 10/08 (T1b N0 M0), 0.8 cm, GIII, ER/NE +, HER-2 negative                    Postmenopausal s/p hysterectomy    Current Treatment: Observation; Raloxifene for bone density    Clinical History:  Patient has a history of stage IA right breast cancer 10/08 s/p lumpectomy and sentinel lymph node dissection. Pathology showed a grade 3 infiltrating ductal carcinoma measuring 0.8 cm and associated DCIS. 2 sentinel lymph nodes were negative for involvement. Tumor was ER/NE positive, HER-2 negative. She apparently had a low risk Oncotype score. She received adjuvant radiation therapy and Tamoxifen for 5 years. She was under the care of Dr. Sage Oneill.    She had an abnormal annual mammogram 9/16/19 at [facility] showing suspicious calcifications in the left lower breast at 6:00. Core biopsy 9/20/19 was positive for high-grade DCIS, focally suspicious for microinvasion. Estrogen and progesterone receptors were negative. Bilateral breast MRI 10/7/19 showed an area of non-mass enhancement in the lower outer quadrant of the left breast at the site of the biopsy clip measuring 2.1 x 0.8 cm. There is no suspicious axillary or internal mammary adenopathy. Right breast showed no suspicious lesions or enhancement. She was seen as a new patient 10/29/19. She was very nervous and worried about her new diagnosis of DCIS. She had multiple questions regarding prognosis and adjuvant treatment options, the majority of which could not be answered until final pathology was obtained. She lost her  to a primary mediastinal germ cell tumor. She underwent a left breast lumpectomy and  sentinel lymph node dissection 10/30/19.    Final pathology showed a 0.4 cm grade 2, triple negative infiltrating ductal carcinoma and extensive DCIS. Initial resection margins showed invasive carcinoma less than 1 mm from the medial and anterior margins. Additional resection showed negative margins for invasive carcinoma but focally positive for DCIS. 5 sentinel lymph nodes were negative for involvement. She saw Dr. Becerril for a second surgical opinion. She elected to proceed with bilateral mastectomies 12/6/19. Left breast showed a residual focus of DCIS measuring 1.0 cm adjacent to the previous biopsy site. There was no evidence of residual invasive carcinoma. Right breast showed benign pathologic changes. Given her small tumor size and mastectomy, there were no indications for adjuvant chemotherapy or radiation. Genetic testing showed no significant mutations.    She had an area of darkened skin adjacent to the left mastectomy incision.  She was seen by Surgery and Dermatology.  Findings were not suspicious for disease recurrence.  Punch biopsies were performed which revealed scar tissue with no evidence of malignancy.  She was placed on Raloxifene for treatment of her bone density by Gynecology.    Bone density exam 10/22 showed improved bone density of the lumbar spine with a T-score of-2.3, previously -2.7.  Bone density of the left femoral neck was stable at -2.3, previously-2.4 and slightly progressed in the total hip with a T-score of -2, previously -1.7.  Recommendations were to continue the raloxifene.       Colonoscopy 1/22- normal (Dr. Bharathi Shen).    Interval History  Mrs. Velasco is here today by herself for an annual breast cancer survivorship visit.  She feels well and has no complaints.  She denies any recent illnesses.  Dermatology removed a seborrheic keratosis from her right lateral chest wall.  She denies any changes to her self examination.  She still maintains annual follow-up with   "[facility].  She continues on Raloxifene for treatment of her bone density.  Last bone density was 10/24.  I have requested a copy for our record.  Laboratory for this visit was unremarkable, including CMP, CBC and CEA level.  CA 27-29 level is pending.  Results discussed today with the patient.    Review of Systems   Constitutional:  Negative for appetite change, fatigue, fever and unexpected weight change.   HENT:  Negative for mouth sores, sore throat and trouble swallowing.    Eyes: Negative.  Negative for visual disturbance.   Respiratory:  Negative for cough, chest tightness and shortness of breath.    Cardiovascular:  Negative for chest pain, palpitations and leg swelling.   Gastrointestinal:  Negative for abdominal distention, abdominal pain, blood in stool, change in bowel habit, constipation, diarrhea, nausea and vomiting.   Genitourinary:  Negative for dysuria, frequency and urgency.   Musculoskeletal:  Negative for arthralgias and back pain.   Integumentary:  Negative for rash and mole/lesion.   Neurological:  Negative for headaches.   Hematological:  Negative for adenopathy. Does not bruise/bleed easily.   Psychiatric/Behavioral:  The patient is not nervous/anxious.        PMHx:  Right breast cancer, glaucoma, GERD, cataracts, osteoporosis  Menarche age 11, first pregnancy 27, 3 children (+ breast fed), hysterectomy 39, menopause 46, no HRT  PSHx:  Left breast fibroadenoma resection, right lumpectomy, bilateral breast biopsies, hysterectomy, thyroid nodule resection,  x3, glaucoma surgery, left lumpectomy, bilateral mastectomies  SH:  Lifetime nonsmoker, occasional alcohol use. 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 Her paternal grandmother had ovarian cancer in a paternal aunt had liver cancer     Objective:        BP (!) 102/59   Pulse (!) 57   Temp 98.6 °F (37 °C)   Resp 15   Ht 5' 2" (1.575 m)   Wt 51.7 kg " (114 lb)   SpO2 100%   BMI 20.85 kg/m²    Physical Exam  Constitutional:       Appearance: Normal appearance.      Comments: Thin white female in NAD.   HENT:      Head: Normocephalic.      Nose: Nose normal.      Mouth/Throat:      Mouth: Mucous membranes are moist.      Pharynx: Oropharynx is clear. No posterior oropharyngeal erythema.   Eyes:      General: No scleral icterus.     Extraocular Movements: Extraocular movements intact.      Conjunctiva/sclera: Conjunctivae normal.   Cardiovascular:      Rate and Rhythm: Normal rate and regular rhythm.      Heart sounds: No murmur heard.  Pulmonary:      Effort: Pulmonary effort is normal.      Breath sounds: Normal breath sounds.   Chest:      Comments: Well healed bilateral mastectomy incisions. 2 cm area of thickened skin adjacent to left incision (unchanged). No suspicious masses or axillary nodes bilaterally.  Abdominal:      General: Abdomen is flat. Bowel sounds are normal. There is no distension.      Palpations: Abdomen is soft. There is no mass.      Tenderness: There is no abdominal tenderness.   Musculoskeletal:         General: No swelling or tenderness. Normal range of motion.      Cervical back: Neck supple. No tenderness.   Lymphadenopathy:      Cervical: No cervical adenopathy.   Skin:     General: Skin is warm and dry.      Findings: No lesion or rash.   Neurological:      General: No focal deficit present.      Mental Status: She is alert and oriented to person, place, and time.      Cranial Nerves: No cranial nerve deficit.      Gait: Gait normal.   Psychiatric:         Mood and Affect: Mood normal.         Behavior: Behavior normal.       ECOG SCORE    0 - Fully active-able to carry on all pre-disease performance without restriction          LABORATORY  Recent Results (from the past 2 weeks)   CEA    Collection Time: 07/16/25  7:59 AM   Result Value Ref Range    Carcinoembryonic Antigen <1.73 0.00 - 3.00 ng/mL   Comprehensive Metabolic Panel     Collection Time: 07/16/25  7:59 AM   Result Value Ref Range    Sodium 140 136 - 145 mmol/L    Potassium 4.7 3.5 - 5.1 mmol/L    Chloride 104 98 - 107 mmol/L    CO2 27 23 - 31 mmol/L    Glucose 123 (H) 82 - 115 mg/dL    Blood Urea Nitrogen 19.4 9.8 - 20.1 mg/dL    Creatinine 0.90 0.55 - 1.02 mg/dL    Calcium 9.2 8.4 - 10.2 mg/dL    Protein Total 7.2 5.8 - 7.6 gm/dL    Albumin 3.6 3.4 - 4.8 g/dL    Globulin 3.6 (H) 2.4 - 3.5 gm/dL    Albumin/Globulin Ratio 1.0 (L) 1.1 - 2.0 ratio    Bilirubin Total 0.3 <=1.5 mg/dL    ALP 47 40 - 150 unit/L    ALT 18 0 - 55 unit/L    AST 26 11 - 45 unit/L    eGFR >60 mL/min/1.73/m2    Anion Gap 9.0 mEq/L    BUN/Creatinine Ratio 22    CBC with Differential    Collection Time: 07/16/25  7:59 AM   Result Value Ref Range    WBC 6.14 4.50 - 11.50 x10(3)/mcL    RBC 4.33 4.20 - 5.40 x10(6)/mcL    Hgb 13.2 12.0 - 16.0 g/dL    Hct 40.2 37.0 - 47.0 %    MCV 92.8 80.0 - 94.0 fL    MCH 30.5 27.0 - 31.0 pg    MCHC 32.8 (L) 33.0 - 36.0 g/dL    RDW 14.2 11.5 - 17.0 %    Platelet 256 130 - 400 x10(3)/mcL    MPV 8.9 7.4 - 10.4 fL    Neut % 51.7 %    Lymph % 36.5 %    Mono % 6.7 %    Eos % 3.9 %    Basophil % 1.0 %    Imm Grans % 0.2 %    Neut # 3.18 2.1 - 9.2 x10(3)/mcL    Lymph # 2.24 0.6 - 4.6 x10(3)/mcL    Mono # 0.41 0.1 - 1.3 x10(3)/mcL    Eos # 0.24 0 - 0.9 x10(3)/mcL    Baso # 0.06 <=0.2 x10(3)/mcL    Imm Gran # 0.01 0.00 - 0.04 x10(3)/mcL           Assessment:   Stage IA triple negative left breast cancer 10/19 - JAYSON  Extensive DCIS left breast  S/p bilateral mastectomies  History of stage IA right breast cancer 10/08  Postmenopausal s/p hysterectomy    Plan:   Patient is nearly 6 years out from her left triple negative breast cancer.  She has an unremarkable clinical exam.  All health maintenance is up to date.  I have requested a copy of her most recent bone density exam for our record.  She will continue Raloxifene for treatment of her bone density.  She will return in one year for ongoing  breast cancer survivorship.  CBC, CMP, CEA and CA 27-29 level prior to the visit.  All questions answered to the satisfaction of the patient.    AV MARX, FNP-C  [facility]       Other Physicians  Dr. Kahlil De Oliveira

## 2025-07-22 ENCOUNTER — OFFICE VISIT (OUTPATIENT)
Dept: HEMATOLOGY/ONCOLOGY | Facility: CLINIC | Age: 70
End: 2025-07-22
Payer: MEDICARE

## 2025-07-22 VITALS
WEIGHT: 114 LBS | TEMPERATURE: 99 F | HEIGHT: 62 IN | BODY MASS INDEX: 20.98 KG/M2 | RESPIRATION RATE: 15 BRPM | DIASTOLIC BLOOD PRESSURE: 59 MMHG | SYSTOLIC BLOOD PRESSURE: 102 MMHG | OXYGEN SATURATION: 100 % | HEART RATE: 57 BPM

## 2025-07-22 DIAGNOSIS — Z85.3 HISTORY OF LEFT BREAST CANCER: Primary | ICD-10-CM

## 2025-07-22 LAB — CANCER AG27-29 SERPL-ACNC: 19 U/ML

## 2025-07-22 PROCEDURE — 99999 PR PBB SHADOW E&M-EST. PATIENT-LVL III: CPT | Mod: PBBFAC,,, | Performed by: NURSE PRACTITIONER

## 2025-07-22 PROCEDURE — 99213 OFFICE O/P EST LOW 20 MIN: CPT | Mod: PBBFAC | Performed by: NURSE PRACTITIONER

## 2025-07-22 PROCEDURE — 99213 OFFICE O/P EST LOW 20 MIN: CPT | Mod: S$PBB,,, | Performed by: NURSE PRACTITIONER
